# Patient Record
Sex: MALE | NOT HISPANIC OR LATINO | Employment: FULL TIME | URBAN - METROPOLITAN AREA
[De-identification: names, ages, dates, MRNs, and addresses within clinical notes are randomized per-mention and may not be internally consistent; named-entity substitution may affect disease eponyms.]

---

## 2017-07-11 ENCOUNTER — OFFICE VISIT (OUTPATIENT)
Dept: FAMILY MEDICINE | Facility: CLINIC | Age: 42
End: 2017-07-11
Payer: COMMERCIAL

## 2017-07-11 VITALS
SYSTOLIC BLOOD PRESSURE: 126 MMHG | OXYGEN SATURATION: 98 % | DIASTOLIC BLOOD PRESSURE: 80 MMHG | HEIGHT: 69 IN | TEMPERATURE: 98.1 F | BODY MASS INDEX: 28.88 KG/M2 | WEIGHT: 195 LBS | HEART RATE: 69 BPM

## 2017-07-11 DIAGNOSIS — T73.3XXA: ICD-10-CM

## 2017-07-11 DIAGNOSIS — E86.1 HYPOVOLEMIA: ICD-10-CM

## 2017-07-11 DIAGNOSIS — R74.8 ELEVATED CK: Primary | ICD-10-CM

## 2017-07-11 LAB
ALBUMIN UR-MCNC: NEGATIVE MG/DL
APPEARANCE UR: CLEAR
BILIRUB UR QL STRIP: NEGATIVE
COLOR UR AUTO: YELLOW
GLUCOSE UR STRIP-MCNC: NEGATIVE MG/DL
HGB UR QL STRIP: NEGATIVE
KETONES UR STRIP-MCNC: NEGATIVE MG/DL
LEUKOCYTE ESTERASE UR QL STRIP: NEGATIVE
NITRATE UR QL: NEGATIVE
PH UR STRIP: 6.5 PH (ref 5–7)
SP GR UR STRIP: 1.01 (ref 1–1.03)
URN SPEC COLLECT METH UR: NORMAL
UROBILINOGEN UR STRIP-ACNC: 0.2 EU/DL (ref 0.2–1)

## 2017-07-11 PROCEDURE — 81003 URINALYSIS AUTO W/O SCOPE: CPT | Performed by: PHYSICIAN ASSISTANT

## 2017-07-11 PROCEDURE — 80053 COMPREHEN METABOLIC PANEL: CPT | Performed by: PHYSICIAN ASSISTANT

## 2017-07-11 PROCEDURE — 99213 OFFICE O/P EST LOW 20 MIN: CPT | Performed by: PHYSICIAN ASSISTANT

## 2017-07-11 PROCEDURE — 82550 ASSAY OF CK (CPK): CPT | Performed by: PHYSICIAN ASSISTANT

## 2017-07-11 PROCEDURE — 36415 COLL VENOUS BLD VENIPUNCTURE: CPT | Performed by: PHYSICIAN ASSISTANT

## 2017-07-11 NOTE — PROGRESS NOTES
"  SUBJECTIVE:                                                    Kobe Bravo III is a 42 year old male who presents to clinic today for the following health issues:    ED/UC Followup:    [place]  [facility] [place] of visit: 17  Reason for visit: Hypovolemia/dehydration  Current Status: states he is feeling good, has returned to normal activities. Has had some fatigue and loss of appetite both are slowly getting better.     -Patient is a 43yo male for follow up after [place] [facility] [place]  -He was participating in a triathalon and towards the end of the race passed out  -he remembers starting to stumble but his teammates told him he was actually stretching and sitting (does not remember this well)  -He feels like he was hydrating and fueling well    -At the ED he had elevated creatinine and BUN, lowered GFR  -CK total: 3692    -Today is the best he felt  -Denies any headache  -Urination is \"ok\", \"a work in progress\", nothing with blood or darkening  -there is no flank pain  -returning to normal activities    Problem list and histories reviewed & adjusted, as indicated.  Additional history: as documented    Patient Active Problem List   Diagnosis     CARDIOVASCULAR SCREENING; LDL GOAL LESS THAN 160     Past Surgical History:   Procedure Laterality Date     ARTHROSCOPY KNEE WITH MEDIAL MENISCECTOMY  2012    Procedure:ARTHROSCOPY KNEE WITH MEDIAL MENISCECTOMY; Left Knee scope with Partial Medial Menisectomy  ; Surgeon:ONOFRE PATRICIA; Location: OR     ORTHOPEDIC SURGERY      marvin ACL repair     ORTHOPEDIC SURGERY      left foot fracture repair     SEPTOPLASTY       wisdom teeth[         Social History   Substance Use Topics     Smoking status: Never Smoker     Smokeless tobacco: Former User     Quit date: 2011     Alcohol use 0.0 oz/week     0 Standard drinks or equivalent per week      Comment: on occ     Family History   Problem Relation Age of Onset     DIABETES Father       complications of DM age 69 " "    C.A.D. Father      Asthma Mother      HEART DISEASE Mother      A-fib     Alcohol/Drug Paternal Grandmother            Reviewed and updated as needed this visit by clinical staff       Reviewed and updated as needed this visit by Provider         ROS:  Constitutional, HEENT, cardiovascular, pulmonary, gi and gu systems are negative, except as otherwise noted.    OBJECTIVE:     /80  Pulse 69  Temp 98.1  F (36.7  C) (Oral)  Ht 5' 9\" (1.753 m)  Wt 195 lb (88.5 kg)  SpO2 98%  BMI 28.8 kg/m2  Body mass index is 28.8 kg/(m^2).  GENERAL: healthy, alert and no distress  EYES: Eyes grossly normal to inspection, PERRL and conjunctivae and sclerae normal  RESP: lungs clear to auscultation - no rales, rhonchi or wheezes  CV: regular rates and rhythm, normal S1 S2, no S3 or S4 and no murmur, click or rub  ABDOMEN: soft, nontender, no hepatosplenomegaly, no masses and bowel sounds normal  MS: no gross musculoskeletal defects noted, no edema  NEURO: Normal strength and tone, mentation intact and speech normal  PSYCH: mentation appears normal, affect normal/bright    Diagnostic Test Results:  See A/P    ASSESSMENT/PLAN:   1. Elevated CK  2. Hypovolemia  3. Exhaustion due to overexertion, initial encounter  Patient feeling continued improvement since his ED visit. We will plan on trending the CK today along with renal studies and a UA. Hold any nsaids at this point and continue with hydration. Limit any weight resistance training, or any strenuous activity at this point until noting improvement of labs.     Sukumar Lucero PA-C  [facility]    "

## 2017-07-11 NOTE — NURSING NOTE
"Chief Complaint   Patient presents with     ER F/U       Initial /80  Pulse 69  Temp 98.1  F (36.7  C) (Oral)  Ht 5' 9\" (1.753 m)  Wt 195 lb (88.5 kg)  SpO2 98%  BMI 28.8 kg/m2 Estimated body mass index is 28.8 kg/(m^2) as calculated from the following:    Height as of this encounter: 5' 9\" (1.753 m).    Weight as of this encounter: 195 lb (88.5 kg).  Medication Reconciliation: complete   Carlton Reeder CMA        "

## 2017-07-11 NOTE — MR AVS SNAPSHOT
"              After Visit Summary   2017    Kobe Bravo III    MRN: [id number]           Patient Information     Date Of Birth          1975        Visit Information        Provider Department      2017 2:00 PM Sukumar Lucero PA-C [facility]        Today's Diagnoses     Elevated CK    -  1    Hypovolemia        Exhaustion due to overexertion, initial encounter           Follow-ups after your visit        Who to contact     If you have questions or need follow up information about today's clinic visit or your schedule please contact [facility] directly at [phone].  Normal or non-critical lab and imaging results will be communicated to you by [facility], letter or phone within 4 business days after the clinic has received the results. If you do not hear from us within 7 days, please contact the clinic through [facility] or phone. If you have a critical or abnormal lab result, we will notify you by phone as soon as possible.  Submit refill requests through [employer] or call your pharmacy and they will forward the refill request to us. Please allow 3 business days for your refill to be completed.          Additional Information About Your Visit        MyChart Information     [employer] lets you send messages to your doctor, view your test results, renew your prescriptions, schedule appointments and more. To sign up, go to [email or web address] . Click on \"Log in\" on the left side of the screen, which will take you to the Welcome page. Then click on \"Sign up Now\" on the right side of the page.     You will be asked to enter the access code listed below, as well as some personal information. Please follow the directions to create your username and password.     Your access code is: [email or web address]  Expires: 10/9/2017  2:38 PM     Your access code will  in 90 days. If you need help or a new code, please call your [facility] or [phone].        Care " "EveryWhere ID     This is your Care EveryWhere ID. This could be used by other organizations to access your [place] medical records  [id number]        Your Vitals Were     Pulse Temperature Height Pulse Oximetry BMI (Body Mass Index)       69 98.1  F (36.7  C) (Oral) 5' 9\" (1.753 m) 98% 28.8 kg/m2        Blood Pressure from Last 3 Encounters:   07/11/17 126/80   08/18/16 118/72   01/31/15 126/80    Weight from Last 3 Encounters:   07/11/17 195 lb (88.5 kg)   08/18/16 200 lb 9.6 oz (91 kg)   01/31/15 197 lb (89.4 kg)              We Performed the Following     *UA reflex to Microscopic and Culture ([facility] (except [place] and [place])     CK total     Comprehensive metabolic panel        Primary Care Provider Office Phone # Fax #    Gene Greco [phone]       XXX RESIGNED  [address] [facility] [address]  [facility] [address]        Equal Access to Services     [facility]: Hadii aad ku hadasho Soomaali, waaxda luqadaha, qaybta kaalmada adeegyada, waxgenaro lopez . So [facility] [phone].    ATENCIÓN: Si habla español, tiene a gracia disposición servicios gratuitos de asistencia lingüística. Angelaame al [phone].    We comply with applicable federal civil rights laws and [place] laws. We do not discriminate on the basis of race, color, national origin, age, disability sex, sexual orientation or gender identity.            Thank you!     Thank you for choosing [facility]  for your care. Our goal is always to provide you with excellent care. Hearing back from our patients is one way we can continue to improve our services. Please take a few minutes to complete the written survey that you may receive in the mail after your visit with us. Thank you!             Your Updated Medication List - Protect others around you: Learn how to safely use, store and throw away your medicines at www.disposemymeds.org.          This list is accurate " as of: 7/11/17  2:38 PM.  Always use your most recent med list.                   Brand Name Dispense Instructions for use Diagnosis    NO ACTIVE MEDICATIONS

## 2017-07-12 DIAGNOSIS — R74.8 ELEVATED CK: Primary | ICD-10-CM

## 2017-07-12 LAB
ALBUMIN SERPL-MCNC: 4.1 G/DL (ref 3.4–5)
ALP SERPL-CCNC: 55 U/L (ref 40–150)
ALT SERPL W P-5'-P-CCNC: 338 U/L (ref 0–70)
ANION GAP SERPL CALCULATED.3IONS-SCNC: 8 MMOL/L (ref 3–14)
AST SERPL W P-5'-P-CCNC: 152 U/L (ref 0–45)
BILIRUB SERPL-MCNC: 0.6 MG/DL (ref 0.2–1.3)
BUN SERPL-MCNC: 16 MG/DL (ref 7–30)
CALCIUM SERPL-MCNC: 9.5 MG/DL (ref 8.5–10.1)
CHLORIDE SERPL-SCNC: 106 MMOL/L (ref 94–109)
CK SERPL-CCNC: 1828 U/L (ref 30–300)
CO2 SERPL-SCNC: 27 MMOL/L (ref 20–32)
CREAT SERPL-MCNC: 1.18 MG/DL (ref 0.66–1.25)
GFR SERPL CREATININE-BSD FRML MDRD: 68 ML/MIN/1.7M2
GLUCOSE SERPL-MCNC: 89 MG/DL (ref 70–99)
POTASSIUM SERPL-SCNC: 4 MMOL/L (ref 3.4–5.3)
PROT SERPL-MCNC: 7.1 G/DL (ref 6.8–8.8)
SODIUM SERPL-SCNC: 141 MMOL/L (ref 133–144)

## 2017-07-20 DIAGNOSIS — R74.8 ELEVATED CK: ICD-10-CM

## 2017-07-20 LAB
ALBUMIN SERPL-MCNC: 4.3 G/DL (ref 3.4–5)
ALP SERPL-CCNC: 59 U/L (ref 40–150)
ALT SERPL W P-5'-P-CCNC: 63 U/L (ref 0–70)
ANION GAP SERPL CALCULATED.3IONS-SCNC: 3 MMOL/L (ref 3–14)
AST SERPL W P-5'-P-CCNC: 20 U/L (ref 0–45)
BILIRUB SERPL-MCNC: 0.6 MG/DL (ref 0.2–1.3)
BUN SERPL-MCNC: 23 MG/DL (ref 7–30)
CALCIUM SERPL-MCNC: 10.2 MG/DL (ref 8.5–10.1)
CHLORIDE SERPL-SCNC: 105 MMOL/L (ref 94–109)
CK SERPL-CCNC: 81 U/L (ref 30–300)
CO2 SERPL-SCNC: 31 MMOL/L (ref 20–32)
CREAT SERPL-MCNC: 1.02 MG/DL (ref 0.66–1.25)
GFR SERPL CREATININE-BSD FRML MDRD: 81 ML/MIN/1.7M2
GLUCOSE SERPL-MCNC: 86 MG/DL (ref 70–99)
POTASSIUM SERPL-SCNC: 4.4 MMOL/L (ref 3.4–5.3)
PROT SERPL-MCNC: 7.5 G/DL (ref 6.8–8.8)
SODIUM SERPL-SCNC: 139 MMOL/L (ref 133–144)

## 2017-07-20 PROCEDURE — 82550 ASSAY OF CK (CPK): CPT | Performed by: PHYSICIAN ASSISTANT

## 2017-07-20 PROCEDURE — 36415 COLL VENOUS BLD VENIPUNCTURE: CPT | Performed by: PHYSICIAN ASSISTANT

## 2017-07-20 PROCEDURE — 80053 COMPREHEN METABOLIC PANEL: CPT | Performed by: PHYSICIAN ASSISTANT

## 2018-03-22 ENCOUNTER — OFFICE VISIT (OUTPATIENT)
Dept: FAMILY MEDICINE | Facility: CLINIC | Age: 43
End: 2018-03-22
Payer: COMMERCIAL

## 2018-03-22 VITALS
OXYGEN SATURATION: 98 % | WEIGHT: 211.7 LBS | RESPIRATION RATE: 19 BRPM | SYSTOLIC BLOOD PRESSURE: 133 MMHG | BODY MASS INDEX: 31.36 KG/M2 | TEMPERATURE: 98.4 F | HEIGHT: 69 IN | DIASTOLIC BLOOD PRESSURE: 72 MMHG | HEART RATE: 80 BPM

## 2018-03-22 DIAGNOSIS — Z13.6 CARDIOVASCULAR SCREENING; LDL GOAL LESS THAN 160: ICD-10-CM

## 2018-03-22 DIAGNOSIS — Z00.00 ENCOUNTER FOR ROUTINE ADULT HEALTH EXAMINATION WITHOUT ABNORMAL FINDINGS: Primary | ICD-10-CM

## 2018-03-22 PROCEDURE — 99396 PREV VISIT EST AGE 40-64: CPT | Performed by: PHYSICIAN ASSISTANT

## 2018-03-22 ASSESSMENT — ENCOUNTER SYMPTOMS
HEMATOCHEZIA: 0
DIZZINESS: 0
CHILLS: 0
HEMATURIA: 0
COUGH: 0
CONSTIPATION: 0
ABDOMINAL PAIN: 0
DIARRHEA: 0
EYE PAIN: 0

## 2018-03-22 NOTE — MR AVS SNAPSHOT
After Visit Summary   3/22/2018    Kobe Bravo III    MRN: [id number]           Patient Information     Date Of Birth          1975        Visit Information        Provider Department      3/22/2018 8:00 AM Sukumar Lucero PA-C [facility]        Today's Diagnoses     Encounter for routine adult health examination without abnormal findings    -  1    CARDIOVASCULAR SCREENING; LDL GOAL LESS THAN 160          Care Instructions      Preventive Health Recommendations  Male Ages 40 to 49    Yearly exam:             See your health care provider every year in order to  o   Review health changes.   o   Discuss preventive care.    o   Review your medicines if your doctor has prescribed any.    You should be tested each year for STDs (sexually transmitted diseases) if you re at risk.     Have a cholesterol test every 5 years.     Have a colonoscopy (test for colon cancer) if someone in your family has had colon cancer or polyps before age 50.     After age 45, have a diabetes test (fasting glucose). If you are at risk for diabetes, you should have this test every 3 years.      Talk with your health care provider about whether or not a prostate cancer screening test (PSA) is right for you.    Shots: Get a flu shot each year. Get a tetanus shot every 10 years.     Nutrition:    Eat at least 5 servings of fruits and vegetables daily.     Eat whole-grain bread, whole-wheat pasta and brown rice instead of white grains and rice.     Talk to your provider about Calcium and Vitamin D.     Lifestyle    Exercise for at least 150 minutes a week (30 minutes a day, 5 days a week). This will help you control your weight and prevent disease.     Limit alcohol to one drink per day.     No smoking.     Wear sunscreen to prevent skin cancer.     See your dentist every six months for an exam and cleaning.              Follow-ups after your visit        Future tests that were ordered for you today      "Open Future Orders        Priority Expected Expires Ordered    Basic metabolic panel  (Ca, Cl, CO2, Creat, Gluc, K, Na, BUN) Routine  3/22/2019 3/22/2018    Lipid panel reflex to direct LDL Fasting Routine  3/22/2019 3/22/2018            Who to contact     If you have questions or need follow up information about today's clinic visit or your schedule please contact [facility] directly at [phone].  Normal or non-critical lab and imaging results will be communicated to you by [employer], letter or phone within 4 business days after the clinic has received the results. If you do not hear from us within 7 days, please contact the clinic through [employer] or phone. If you have a critical or abnormal lab result, we will notify you by phone as soon as possible.  Submit refill requests through [employer] or call your pharmacy and they will forward the refill request to us. Please allow 3 business days for your refill to be completed.          Additional Information About Your Visit        [employer] Information     [employer] gives you secure access to your electronic health record. If you see a primary care provider, you can also send messages to your care team and make appointments. If you have questions, please call your primary care clinic.  If you do not have a primary care provider, please call [phone] and they will assist you.        Care EveryWhere ID     This is your Care EveryWhere ID. This could be used by other organizations to access your [place] medical records  [id number]        Your Vitals Were     Pulse Temperature Respirations Height Pulse Oximetry BMI (Body Mass Index)    80 98.4  F (36.9  C) (Tympanic) 19 5' 8.5\" (1.74 m) 98% 31.72 kg/m2       Blood Pressure from Last 3 Encounters:   03/22/18 133/72   07/11/17 126/80   08/18/16 118/72    Weight from Last 3 Encounters:   03/22/18 211 lb 11.2 oz (96 kg)   07/11/17 195 lb (88.5 kg)   08/18/16 200 lb 9.6 oz (91 kg)               Primary Care " Provider Office Phone # Fax #    [facility] [phone]       [address]  [facility] [address]        Equal Access to Services     [facility] : Hadii aad ku hadlaineyo Soomaali, waaxda luqadaha, qaybta kaalmada adeegyada, paxton simsn darling hernandez lamaria gjaneth castañeda. So [facility] [phone].    ATENCIÓN: Si habla español, tiene a gracia disposición servicios gratuitos de asistencia lingüística. Llame al [phone].    We comply with applicable federal civil rights laws and [place] laws. We do not discriminate on the basis of race, color, national origin, age, disability, sex, sexual orientation, or gender identity.            Thank you!     Thank you for choosing [facility]  for your care. Our goal is always to provide you with excellent care. Hearing back from our patients is one way we can continue to improve our services. Please take a few minutes to complete the written survey that you may receive in the mail after your visit with us. Thank you!             Your Updated Medication List - Protect others around you: Learn how to safely use, store and throw away your medicines at www.disposemymeds.org.          This list is accurate as of 3/22/18  8:33 AM.  Always use your most recent med list.                   Brand Name Dispense Instructions for use Diagnosis    NO ACTIVE MEDICATIONS

## 2018-03-22 NOTE — PROGRESS NOTES
SUBJECTIVE:   CC: Kobe Bravo III is an 42 year old male who presents for preventative health visit.     Physical   Annual:     Getting at least 3 servings of Calcium per day::  Yes    Bi-annual eye exam::  NO    Dental care twice a year::  Yes    Sleep apnea or symptoms of sleep apnea::  None    Diet::  Regular (no restrictions)    Frequency of exercise::  4-5 days/week    Duration of exercise::  45-60 minutes    Taking medications regularly::  Yes    Medication side effects::  Not applicable    Additional concerns today::  No          Patient presents for annual physical  He remains active regularly but has gained weight  He does not smoke, uses social etoh      Today's PHQ-2 Score:   PHQ-2 ( 1999 [employer]) 3/22/2018   Q1: Little interest or pleasure in doing things 0   Q2: Feeling down, depressed or hopeless 0   PHQ-2 Score 0   Q1: Little interest or pleasure in doing things Not at all   Q2: Feeling down, depressed or hopeless Not at all   PHQ-2 Score 0       Abuse: Current or Past(Physical, Sexual or Emotional)- No  Do you feel safe in your environment - Yes    Social History   Substance Use Topics     Smoking status: Never Smoker     Smokeless tobacco: Former User     Quit date: 6/11/2011     Alcohol use 0.0 oz/week     0 Standard drinks or equivalent per week      Comment: on occ     Alcohol Use 3/22/2018   If you drink alcohol do you typically have greater than 3 drinks per day OR greater than 7 drinks per week? No       Last PSA: No results found for: PSA    Reviewed orders with patient. Reviewed health maintenance and updated orders accordingly - Yes  Labs reviewed in EPIC    Reviewed and updated as needed this visit by clinical staff         Reviewed and updated as needed this visit by Provider            Review of Systems   Constitutional: Negative for chills.   HENT: Negative for congestion and ear pain.    Eyes: Negative for pain.   Respiratory: Negative for cough.    Cardiovascular: Negative for chest  "pain.   Gastrointestinal: Negative for abdominal pain, constipation, diarrhea and hematochezia.   Genitourinary: Negative for hematuria.   Neurological: Negative for dizziness.       OBJECTIVE:   /72 (BP Location: Right arm, Patient Position: Chair, Cuff Size: Adult Large)  Pulse 80  Temp 98.4  F (36.9  C) (Tympanic)  Resp 19  Ht 5' 8.5\" (1.74 m)  Wt 211 lb 11.2 oz (96 kg)  SpO2 98%  BMI 31.72 kg/m2    Physical Exam  GENERAL: healthy, alert and no distress  EYES: Eyes grossly normal to inspection, PERRL and conjunctivae and sclerae normal  HENT: ear canals and TM's normal, nose and mouth without ulcers or lesions  NECK: no adenopathy, no asymmetry, masses, or scars and thyroid normal to palpation  RESP: lungs clear to auscultation - no rales, rhonchi or wheezes  CV: regular rate and rhythm, normal S1 S2, no S3 or S4, no murmur, click or rub, no peripheral edema and peripheral pulses strong  ABDOMEN: soft, nontender, no hepatosplenomegaly, no masses and bowel sounds normal   (male): normal male genitalia without lesions or urethral discharge, no hernia  MS: no gross musculoskeletal defects noted, no edema  NEURO: Normal strength and tone, mentation intact and speech normal  PSYCH: mentation appears normal, affect normal/bright    ASSESSMENT/PLAN:   1. Encounter for routine adult health examination without abnormal findings  - Basic metabolic panel  (Ca, Cl, CO2, Creat, Gluc, K, Na, BUN); Future  - Lipid panel reflex to direct LDL Fasting; Future    2. CARDIOVASCULAR SCREENING; LDL GOAL LESS THAN 160  - Lipid panel reflex to direct LDL Fasting; Future    COUNSELING:   Reviewed preventive health counseling, as reflected in patient instructions       Regular exercise       Healthy diet/nutrition    BP Screening:   Last 3 BP Readings:    BP Readings from Last 3 Encounters:   03/22/18 133/72   07/11/17 126/80   08/18/16 118/72       The following was recommended to the patient:  Re-screen BP within a year " "and recommended lifestyle modifications     reports that he has never smoked. He quit smokeless tobacco use about 6 years ago.    Estimated body mass index is 28.8 kg/(m^2) as calculated from the following:    Height as of 7/11/17: 5' 9\" (1.753 m).    Weight as of 7/11/17: 195 lb (88.5 kg).   Weight management plan: Discussed healthy diet and exercise guidelines and patient will follow up in 12 months in clinic to re-evaluate.    Counseling Resources:  ATP IV Guidelines  Pooled Cohorts Equation Calculator  FRAX Risk Assessment  ICSI Preventive Guidelines  Dietary Guidelines for Americans, 2010  USDA's MyPlate  ASA Prophylaxis  Lung CA Screening    Sukumar Lucero PA-C  [facility]  Answers for HPI/ROS submitted by the patient on 3/22/2018   PHQ-2 Score: 0    "

## 2018-03-23 DIAGNOSIS — Z00.00 ENCOUNTER FOR ROUTINE ADULT HEALTH EXAMINATION WITHOUT ABNORMAL FINDINGS: ICD-10-CM

## 2018-03-23 DIAGNOSIS — Z13.6 CARDIOVASCULAR SCREENING; LDL GOAL LESS THAN 160: ICD-10-CM

## 2018-03-23 LAB
ANION GAP SERPL CALCULATED.3IONS-SCNC: 7 MMOL/L (ref 3–14)
BUN SERPL-MCNC: 17 MG/DL (ref 7–30)
CALCIUM SERPL-MCNC: 9.1 MG/DL (ref 8.5–10.1)
CHLORIDE SERPL-SCNC: 106 MMOL/L (ref 94–109)
CHOLEST SERPL-MCNC: 182 MG/DL
CO2 SERPL-SCNC: 27 MMOL/L (ref 20–32)
CREAT SERPL-MCNC: 0.97 MG/DL (ref 0.66–1.25)
GFR SERPL CREATININE-BSD FRML MDRD: 86 ML/MIN/1.7M2
GLUCOSE SERPL-MCNC: 87 MG/DL (ref 70–99)
HDLC SERPL-MCNC: 63 MG/DL
LDLC SERPL CALC-MCNC: 110 MG/DL
NONHDLC SERPL-MCNC: 119 MG/DL
POTASSIUM SERPL-SCNC: 4.3 MMOL/L (ref 3.4–5.3)
SODIUM SERPL-SCNC: 140 MMOL/L (ref 133–144)
TRIGL SERPL-MCNC: 46 MG/DL

## 2018-03-23 PROCEDURE — 80061 LIPID PANEL: CPT | Performed by: PHYSICIAN ASSISTANT

## 2018-03-23 PROCEDURE — 36415 COLL VENOUS BLD VENIPUNCTURE: CPT | Performed by: PHYSICIAN ASSISTANT

## 2018-03-23 PROCEDURE — 80048 BASIC METABOLIC PNL TOTAL CA: CPT | Performed by: PHYSICIAN ASSISTANT

## 2018-11-30 ENCOUNTER — OFFICE VISIT (OUTPATIENT)
Dept: FAMILY MEDICINE | Facility: CLINIC | Age: 43
End: 2018-11-30
Payer: COMMERCIAL

## 2018-11-30 VITALS
SYSTOLIC BLOOD PRESSURE: 131 MMHG | WEIGHT: 223.1 LBS | OXYGEN SATURATION: 97 % | HEIGHT: 69 IN | RESPIRATION RATE: 12 BRPM | DIASTOLIC BLOOD PRESSURE: 82 MMHG | BODY MASS INDEX: 33.04 KG/M2 | HEART RATE: 74 BPM | TEMPERATURE: 98.8 F

## 2018-11-30 DIAGNOSIS — R06.83 SNORING: Primary | ICD-10-CM

## 2018-11-30 DIAGNOSIS — Z23 NEED FOR PROPHYLACTIC VACCINATION AND INOCULATION AGAINST INFLUENZA: ICD-10-CM

## 2018-11-30 PROCEDURE — 99213 OFFICE O/P EST LOW 20 MIN: CPT | Mod: 25 | Performed by: PHYSICIAN ASSISTANT

## 2018-11-30 PROCEDURE — 90686 IIV4 VACC NO PRSV 0.5 ML IM: CPT | Performed by: PHYSICIAN ASSISTANT

## 2018-11-30 PROCEDURE — 90471 IMMUNIZATION ADMIN: CPT | Performed by: PHYSICIAN ASSISTANT

## 2018-11-30 NOTE — PROGRESS NOTES
SUBJECTIVE:   Kobe Bravo III is a 43 year old male who presents to clinic today for the following health issues:    Insomnia    Duration: Last couple months     Description  Frequency of insomnia:  1-2 times a week  Time to fall asleep: 15 minutes  Middle of night awakenin-2 times a week  Early morning awakenin-2 times a week    Accompanying signs and symptoms:  snoring    History  Similar episodes in past:  no   Previous evaluation/sleep study:  no     Precipitating or alleviating factors:  New stressful situation: no   Caffeine intake after lunchtime: no   OTC decongestants: no   Any new medications: no     Therapies tried and outcome: none    -Patient presents to discuss recent changes to sleep  -He notes in an ideal situation he'll go to bed at 9-930, get up around 4-5  -there are usually no problems sleeping  -normally he is able to stay asleep during the night   -periodically he will wake up, but cannot fall back asleep  -he does feel rested when waking up unless it is one of the days where he struggled the night before  -no extreme daytime drowsiness  -he does snore heavily per spouse; there are observed times where it looks like he stops breathing  -some snort arousal  -he does not wake up with shortness of breath, gasping  -it does seem to be getting worse  -impacting spouse    Problem list and histories reviewed & adjusted, as indicated.  Additional history: as documented    Patient Active Problem List   Diagnosis     CARDIOVASCULAR SCREENING; LDL GOAL LESS THAN 160     Past Surgical History:   Procedure Laterality Date     ARTHROSCOPY KNEE WITH MEDIAL MENISCECTOMY  2012    Procedure:ARTHROSCOPY KNEE WITH MEDIAL MENISCECTOMY; Left Knee scope with Partial Medial Menisectomy  ; Surgeon:ONOFRE PATRICIA; Location: OR     ORTHOPEDIC SURGERY      marvin ACL repair     ORTHOPEDIC SURGERY      left foot fracture repair     SEPTOPLASTY       wisdom teeth[         Social History   Substance Use  "Topics     Smoking status: Never Smoker     Smokeless tobacco: Former User     Quit date: 2011     Alcohol use 0.0 oz/week     0 Standard drinks or equivalent per week      Comment: on occ     Family History   Problem Relation Age of Onset     Diabetes Father       complications of DM age 69     C.A.D. Father      Asthma Mother      Heart Disease Mother      A-fib     Alcohol/Drug Paternal Grandmother            Reviewed and updated as needed this visit by clinical staff       Reviewed and updated as needed this visit by Provider         ROS:  Constitutional, HEENT, cardiovascular, pulmonary, gi and gu systems are negative, except as otherwise noted.    OBJECTIVE:     /82  Pulse 74  Temp 98.8  F (37.1  C) (Tympanic)  Resp 12  Ht 5' 8.5\" (1.74 m)  Wt 223 lb 1.6 oz (101.2 kg)  SpO2 97%  BMI 33.43 kg/m2  Body mass index is 33.43 kg/(m^2).  GENERAL: healthy, alert and no distress  EYES: Eyes grossly normal to inspection, PERRL and conjunctivae and sclerae normal  HENT: normal cephalic/atraumatic, ear canals and TM's normal, nasal mucosa edematous , oropharynx clear, oral mucous membranes moist and tonsil wnl 1+  RESP: lungs clear to auscultation - no rales, rhonchi or wheezes  CV: regular rates and rhythm, normal S1 S2, no S3 or S4 and no murmur, click or rub  ABDOMEN: obese, soft    Diagnostic Test Results:  none     ASSESSMENT/PLAN:   1. Snoring  Stop bang mid to high. There are witnessed apneas and snort arousal. Has added some weight. Referral to sleep placed.  - SLEEP EVALUATION & MANAGEMENT REFERRAL - ADULT -[facility] [place]  [phone] (Age 18 and up); Future    2. Need for prophylactic vaccination and inoculation against influenza  - FLU VACCINE, SPLIT VIRUS, IM (QUADRIVALENT) [96129]- >3 YRS  - Vaccine Administration, Initial [65202]      Sukumar Lucero PA-C  [facility]    Injectable Influenza Immunization Documentation    1.  Is the person " to be vaccinated sick today?   No    2. Does the person to be vaccinated have an allergy to a component   of the vaccine?   No  Egg Allergy Algorithm Link    3. Has the person to be vaccinated ever had a serious reaction   to influenza vaccine in the past?   No    4. Has the person to be vaccinated ever had Guillain-Barré syndrome?   No    Form completed by Elaine Vivas MA

## 2018-11-30 NOTE — MR AVS SNAPSHOT
After Visit Summary   11/30/2018    Kobe Bravo III    MRN: [id number]           Patient Information     Date Of Birth          1975        Visit Information        Provider Department      11/30/2018 9:00 AM Sukumar Lucero PA-C [facility]        Today's Diagnoses     Snoring    -  1    Need for prophylactic vaccination and inoculation against influenza           Follow-ups after your visit        Additional Services     SLEEP EVALUATION & MANAGEMENT REFERRAL - [facility]  [phone] (Age 18 and up)       Please be aware that coverage of these services is subject to the terms and limitations of your health insurance plan.  Call member services at your health plan with any benefit or coverage questions.      Please bring the following to your appointment:    >>   List of current medications   >>   This referral request   >>   Any documents/labs given to you for this referral                      Follow-up notes from your care team     Return in about 3 months (around 2/28/2019).      Future tests that were ordered for you today     Open Future Orders        Priority Expected Expires Ordered    SLEEP EVALUATION & MANAGEMENT REFERRAL - [facility]  [phone] (Age 18 and up) Routine  11/30/2019 11/30/2018            Who to contact     If you have questions or need follow up information about today's clinic visit or your schedule please contact [facility] directly at [phone].  Normal or non-critical lab and imaging results will be communicated to you by MyChart, letter or phone within 4 business days after the clinic has received the results. If you do not hear from us within 7 days, please contact the clinic through MyChart or phone. If you have a critical or abnormal lab result, we will notify you by phone as soon as possible.  Submit refill requests through [employer] or call your  "pharmacy and they will forward the refill request to us. Please allow 3 business days for your refill to be completed.          Additional Information About Your Visit        MyChart Information     [employer] gives you secure access to your electronic health record. If you see a primary care provider, you can also send messages to your care team and make appointments. If you have questions, please call your primary care clinic.  If you do not have a primary care provider, please call [phone] and they will assist you.        Care EveryWhere ID     This is your Care EveryWhere ID. This could be used by other organizations to access your [place] medical records  [id number]        Your Vitals Were     Pulse Temperature Respirations Height Pulse Oximetry BMI (Body Mass Index)    74 98.8  F (37.1  C) (Tympanic) 12 5' 8.5\" (1.74 m) 97% 33.43 kg/m2       Blood Pressure from Last 3 Encounters:   11/30/18 131/82   03/22/18 133/72   07/11/17 126/80    Weight from Last 3 Encounters:   11/30/18 223 lb 1.6 oz (101.2 kg)   03/22/18 211 lb 11.2 oz (96 kg)   07/11/17 195 lb (88.5 kg)              We Performed the Following     FLU VACCINE, SPLIT VIRUS, IM (QUADRIVALENT) [70712]- >3 YRS     Vaccine Administration, Initial [38791]        Primary Care Provider Office Phone # Fax #    [facility] [phone] [address] [facility] [address]        Equal Access to Services     [facility] : Hadii aad ku hadasho Soomaali, waaxda luqadaha, qaybta kaalmada adeegyada, waxay ly castañeda. So [facility] [phone].    ATENCIÓN: Si habla español, tiene a gracia disposición servicios gratuitos de asistencia lingüística. Llame al [phone].    We comply with applicable federal civil rights laws and [place] laws. We do not discriminate on the basis of race, color, national origin, age, disability, sex, sexual orientation, or gender identity.            Thank you!     Thank you for " choosing [facility]  for your care. Our goal is always to provide you with excellent care. Hearing back from our patients is one way we can continue to improve our services. Please take a few minutes to complete the written survey that you may receive in the mail after your visit with us. Thank you!             Your Updated Medication List - Protect others around you: Learn how to safely use, store and throw away your medicines at www.disposemymeds.org.          This list is accurate as of 11/30/18  9:37 AM.  Always use your most recent med list.                   Brand Name Dispense Instructions for use Diagnosis    NO ACTIVE MEDICATIONS

## 2019-01-21 ENCOUNTER — OFFICE VISIT (OUTPATIENT)
Dept: SLEEP MEDICINE | Facility: CLINIC | Age: 44
End: 2019-01-21
Attending: PHYSICIAN ASSISTANT
Payer: COMMERCIAL

## 2019-01-21 VITALS
SYSTOLIC BLOOD PRESSURE: 139 MMHG | WEIGHT: 224 LBS | RESPIRATION RATE: 15 BRPM | BODY MASS INDEX: 33.18 KG/M2 | DIASTOLIC BLOOD PRESSURE: 67 MMHG | OXYGEN SATURATION: 98 % | HEART RATE: 80 BPM | HEIGHT: 69 IN

## 2019-01-21 DIAGNOSIS — G47.9 SLEEP DISTURBANCE: Primary | ICD-10-CM

## 2019-01-21 DIAGNOSIS — E66.9 OBESITY (BMI 30-39.9): ICD-10-CM

## 2019-01-21 DIAGNOSIS — R06.83 SNORING: ICD-10-CM

## 2019-01-21 PROCEDURE — 99204 OFFICE O/P NEW MOD 45 MIN: CPT | Performed by: INTERNAL MEDICINE

## 2019-01-21 RX ORDER — ZOLPIDEM TARTRATE 5 MG/1
TABLET ORAL
Qty: 1 TABLET | Refills: 0 | Status: SHIPPED | OUTPATIENT
Start: 2019-01-21 | End: 2019-12-27

## 2019-01-21 ASSESSMENT — MIFFLIN-ST. JEOR: SCORE: 1903.5

## 2019-01-21 NOTE — NURSING NOTE
"Chief Complaint   Patient presents with     Consult     Snores per wife, gasps occasionally per wife,  No SS or cpap       Initial /67   Pulse 80   Resp 15   Ht 1.74 m (5' 8.5\")   Wt 101.6 kg (224 lb)   SpO2 98%   BMI 33.56 kg/m   Estimated body mass index is 33.56 kg/m  as calculated from the following:    Height as of this encounter: 1.74 m (5' 8.5\").    Weight as of this encounter: 101.6 kg (224 lb).    Medication Reconciliation: complete    Neck circumference: 18 inches / 46 centimeters.  Ess 4    Elizabeth Berumen LPN  "

## 2019-01-21 NOTE — PROGRESS NOTES
[facility]  Outpatient Sleep Medicine Consultation  January 21, 2019      Name: Kobe Bravo III MRN# [id number]   Age: 43 year old YOB: 1975     Date of Consultation: January 21, 2019  Consultation is requested by: Sukumar Lucero PA-C  [address] [place] [address]  Primary care provider: [facility] clinic: [facility]       Reason for Sleep Consult:     Kobe Bravo III is a 43 year old male nightly witnessed apnea, snoring, gasping, poor quality of sleep and excessive daytime sleepiness and tiredness.         Assessment and Plan:     Summary Sleep Diagnoses/Recommendations:    1. Sleep Disturbance:  High suspicion of sleep disordered breathing based on patient's symptoms (snoring, excessive daytime sleepiness, witnessed apneas), high BMI, neck circumference and oropharyngeal examination. Will schedule Home sleep study. We also discussed the pathophysiology of sleep disordered breathing and the importance of treating it if S/he should have it. Patient is advised not to drive if he/she feels drowsy or sleepy. Hand out was given to the patient.  Follow up after sleep study to discuss the result of sleep study and treatment options.    2. Obesity:  Counseled regarding weight loss through diet modification and increased physical activity. Patient was given instuctions of weight loss and advised to follow up her PCP for further weight loss interventions.    3. Non restorative sleep: recommend sleep study, stimulus control, avoidance of alcohol at night and good sleep hygiene    4.  Poor sleep hygiene:   - We instructed patient to develop regular sleep-wake schedule and regular sleep habits including regular bedtime and wake time to strengthen circadian rhythm, to sleep as much as needed to feel refreshed, not to spend more time in bed than needed. Bedroom should be dark, cool and quiet.  - We also asked patient to avoid napping  during the day, forcing you to sleep, taking problems to bed, strenuous activity just before bedtime, use of caffeine, alcohol or tobacco just before bedtime, reading, eating or watching TV in bed.  Good sleep hygiene and sleep-wake instructions were given.        Orders Placed This Encounter   Procedures     HST-Home Sleep Apnea Test    Ambien 5 mg x1    Summary Counseling:  See instructions    Counseling included a comprehensive review of diagnostic and therapeutic strategies as well as risks of inadequate therapy.  Educational materials provided in instructions.    All questions were answered.  The patient indicates understanding of the above issues and agrees with the plan set forth.           History of Present Illness:     Kobe Bravo III is a 43 year old male with no significant past medical history who presents to the [facility] in [place] with complains of sleep disturbance. I was asked to see Mr. Bravo regarding snoring by Sukumar Lucero PA-C.   Patient complaints loud snoring, witnessed apnea, waking up gasping air, frequent awakening at night with some early awakening, restless sleep, non restorative sleep, dry mouth and throat for 6 months. He denies difficulty falling sleep but has difficulty staying asleep and wakes up 1-3 times and takes 1-2 hour to fall back to sleep. He denies morning headaches, acid reflux and restless legs but he kicks legs at night. He has gained 20 pounds in the last year.     Please see below for sleep ROS details.    PREVIOUS IN- LAB or HOME SLEEP STUDIES:  None     SLEEP-WAKE SCHEDULE:     Kobe Bravo III     -Describes themself as a morning person;      -ON WEEKDAYS, goes to sleep at 9:30 PM during the week; awakens  5:00 AM with an alarm; falls asleep in 15 minutes; denies difficulty falling asleep.     -ON WEEKENDS, goes to sleep at 10:00 PM and wakes up at 7:00 AM without an alarm; falls asleep in 15 minutes.       -Awakens 1-3 times a night  for  minutes before falling back to sleep; awakens to uncertain reasons and worries.      -Total sleep time: 6 hours per night.    -Naps 0 times/days per week      BEDTIME ACTIVITIES AND SHIFT WORK:    Kobe Bravo III    -does use electronics in bed and read in bed and does not watch TV in bed.     -does not do shift work.  He/she works day shifts.      Occupation: natural resource     [employer]       SLEEP APNEA: Stopbang score: 5       INSOMNIA:  Insomnia severity score:11       SLEEPINESS: [place] sleepiness scale (ESS): 4   Drowsy driving/near accidents: No          PHQ9: N/A    SLEEP COMPLAINTS:   Snoring- 3 days/week  Witness apnea: Yes  Gasping/Choking: Yes  Excessive daytime sleep: Yes  Toss/turn: Yes/No  Excessive tiredness/fatigue:  Yes  Morning headaches: No  Dry mouth/throat: Yes  Dyspnea: No  Coexisting Lung disease: No    Coexisting Heart disease: No    Does patient have a bed partner: Yes  Has bed partner been sleeping separately because of snoring:  No            RLS Screen: When you try to relax in the evening or sleep at  night, do you ever have unpleasant, restless feelings in your  legs that can be relieved by walking or movement? No    Periodic limb movement: Yes    Narcolepsy:       denies sudden urges of sleep attacks     denies cataplexy     denies sleep paralysis      denies hallucinations     Sleep Behaviors:     denies leg symptoms/movements     denies motor restlessness     denies night terrors     denies bruxism     denies automatic behaviors    Other subjective complaints:     denies anxiety or rumination      denies pain and discomfort at  night     denies waking up with heart pounding or racing     denies GERD/heartburn         Parasomnia:   NREM - denies recurrent persistent confusional arousal, night eating, sleep walking or sleep terrors   REM  - denies dream enactment; no injuries.     Safety: None             Medications:     Current Outpatient Medications   Medication Sig      NO ACTIVE MEDICATIONS      No current facility-administered medications for this visit.         Medication that can affect sleep: none     No Known Allergies         Past Medical History:     Does not need 02 supplement at night     No past medical history on file.            Past Surgical History:    Yes previous upper airway surgery: septoplasty for broken nose    Past Surgical History:   Procedure Laterality Date     ARTHROSCOPY KNEE WITH MEDIAL MENISCECTOMY  2012    Procedure:ARTHROSCOPY KNEE WITH MEDIAL MENISCECTOMY; Left Knee scope with Partial Medial Menisectomy  ; Surgeon:ONOFRE PATRICIA; Location:[facility] OR     ORTHOPEDIC SURGERY      marvin ACL repair     ORTHOPEDIC SURGERY      left foot fracture repair     SEPTOPLASTY       wisdom teeth[              Social History:     Social History     Tobacco Use     Smoking status: Never Smoker     Smokeless tobacco: Former User   Substance Use Topics     Alcohol use: Yes     Alcohol/week: 0.0 oz     Comment: on occ         Chemical History:     Tobacco: No     Uses 2-3 cups/day of coffee. Last caffeine intake is usually before 10:30 am    EtOH: Yes, 3 x a week 3-4 beers  Recreational Drugs: No    Psych Hx:   None    Current dangers to self or others: None           Family History:     Family History   Problem Relation Age of Onset     Diabetes Father          complications of DM age 69     C.A.D. Father      Asthma Mother      Heart Disease Mother         A-fib     Alcohol/Drug Paternal Grandmother         Sleep Family Hx:        RLS- No  ANGELINE - mother and father  Insomnia - mother  Parasomnia - No         Review of Systems:     A complete 10 point review of systems was negative other than HPI or as commented below:   Patient denies chest pain, dyspnea with activity and or rest, wheezing, abdominal pain, n&v, fever, chills, dysuria, leg pain or swelling. Patient is also denies ear pain, sore throat, postnasal drip, running nose, dry cough.      Kobe CASSIDY  "Lawrence ESCOBAR has gained 20 pounds in the last year.            Physical Examination:   /67   Pulse 80   Resp 15   Ht 1.74 m (5' 8.5\")   Wt 101.6 kg (224 lb)   SpO2 98%   BMI 33.56 kg/m       Neck Circumference: 46 cm   Constitutional: . Awake, alert, cooperative, in no apparent distress  Mood: euthymic; affect congruent with full range and intensity.  Attention/Concentration:  Normal   Eyes: Pupils round and reactive. No icterus.  ENT: Mallampati Class: IV.   Tonsillar Stage: 1  hidden by pillars  Clear nasal passages. Enlarged inferior turbinates. No deviated septum.  Oropharynx: No high arched palate. No pharyngeal erythema or exudates, micrognathia, small and crowded oropharynx,  low-lying soft palate, elongated uvula. lateral narrowing  Tongue: relative macroglossia   Dentition: Good.  Dentures: None  Neck: Supple, no thyroid enlargement.   Cardiovascular: Regular S1 and S2, no murmurs or gallops.    Pulmonary:  Chest symmetric, lungs reveal decreased breath sounds at bases. No rales or wheezes.  Abdomen: Soft, obese, non tender.  Extremities:  No pedal edema.  Muscle/joint: Strength and tone normal   Skin:  No rash or significant lesions examined areas of skin.   Neurologic: Alert, oriented x3, no focal neurological deficit.           Data: All pertinent previous laboratory data reviewed     No results found for: PH, PHARTERIAL, PO2, [id number], SAT, PCO2, HCO3, BASEEXCESS, JOSÉ, BEB  Lab Results   Component Value Date    TSH 1.50@ 05/29/2008     Lab Results   Component Value Date    GLC 87 03/23/2018    GLC 86 07/20/2017     Lab Results   Component Value Date    HGB 15.6 10/29/2014    HGB 15.9 01/17/2012     Lab Results   Component Value Date    BUN 17 03/23/2018    BUN 23 07/20/2017    CR 0.97 03/23/2018    CR 1.02 07/20/2017     Lab Results   Component Value Date    CO2 27 03/23/2018    CO2 31 07/20/2017     No results found for: MARGIE      Echocardiography: No    Chest x-ray: No    PFT: " No        Copy to: [facility], [facility]  Copy to: Sukumar Smith MD 1/21/2019   [facility]  [address] [place], [place] [address] [phone] [facility]    Total time spent with patient: 44 minutes with this patient today in which 25 minutes was spent in counseling/coordination of care and going over planned testing and recommendations.

## 2019-01-21 NOTE — PATIENT INSTRUCTIONS
"MY TREATMENT INFORMATION FOR SLEEP DISTURBANCE-  Kobe Bravo III    DOCTOR : Parag Smith MD  SLEEP CENTER :  [facility]    MY CONTACT NUMBER: [phone]        If I haven't had a sleep study yet, what can I expect?  A personal story from [facility]  [email or web address]/watch?v=AxPLmlRpnCs    Am I having a home sleep study?  Here is a video in case you get home and want to make sure you have done it correctly  https://www.[employer].com/watch?v=NIF9C5cDmo5&feature=youtu.be    Suspected sleep apnea: Sleep study ordered.    Follow up in sleep clinic 1-2 weeks after sleep study to discuss results of sleep study and treatment options.    Patient was advised not to drive if drowsy or sleepy.    Frequently asked questions:  1. What is Obstructive Sleep Apnea (ANGELINE)? ANGELINE is the most common type of sleep apnea. Apnea literally means, \"without breath.\" It is characterized by repetitive pauses in breathing, despite continued effort to breathe, and is usually associated with a reduction in blood oxygen saturation. Apneas can last 10 to over 60 seconds. It is caused by narrowing or collapse of the upper airway as muscles relax during sleep. Severity of sleep apnea is determined by frequency of breathing events and their effect on your sleep and oxygen levels determined during sleep testing.   2. What are the consequences of ANGELINE? Symptoms include: daytime sleepiness- possibly increasing the risk of falling asleep while driving, unrefreshing/restless sleep, snoring, insomnia, waking frequently to urinate, waking with heartburn or reflux, reduced concentration and memory, and morning headaches. Other health consequences may include development of high blood pressure and other cardiovascular disease in persons who are susceptible. Untreated ANGELINE  can contribute to heart disease, stroke and diabetes.   3. What are the treatment options? In most situations, sleep apnea is a lifelong disease that must be managed with daily " therapy. Medications are not effective for sleep apnea and surgery is generally not performed until other therapies have been tried. Therapy is usually tailored to the individual patient based on many factors including your wishes as well as severity of sleep apnea and severity of obesity. Continuous Positive Airway (CPAP) is the most reliable treatment. An oral device to hold your jaw forward is usually the next most reliable option. Other options include postioning devices (to keep you off your back), weight loss, and surgery including a tongue pacing device. There is more detail about some of these options below.    Central sleep apnea is a disorder in which your breathing repeatedly stops and starts during sleep.  Central sleep apnea occurs because your brain doesn't send proper signals to the muscles that control your breathing. This condition is different from obstructive sleep apnea, in which you can't breathe normally because of upper airway obstruction. Central sleep apnea is less common than obstructive sleep apnea.  Central sleep apnea may occur as a result of other conditions, such as heart failure and stroke. Sleeping at a high altitude and pain medications also may cause central sleep apnea.        1. CPAP-  WHAT DOES IT DO AND HOW CAN I LEARN TO WEAR IT?                               BEFORE I START, CAN I WATCH A MOVIE TO GET A PLAN ON HOW TO USE CPAP?  https://www.[employer].com/watch?y=e3K14pj440Z      Continuous positive airway pressure, or CPAP, is the most effective treatment for obstructive sleep apnea. It works by blowing room air, through a mask, to hold your throat open. A decision to use CPAP is a major step forward in the pursuit of a healthier life. The successful use of CPAP will help you breathe easier, sleep better and live healthier. You can choose CPAP equipment from any durable medical equipment provider that meets your needs.  Using CPAP can be a positive experience if you keep these  "key points in mind:  1. Commitment  CPAP is not a quick fix for your problem. It involves a long-term commitment to improve your sleep and your health.    2. Communication  Stay in close communication with both your sleep doctor and your CPAP supplier. Ask lots of questions and seek help when you need it.    3. Consistency  Use CPAP all night, every night and for every nap. You will receive the maximum health benefits from CPAP when you use it every time that you sleep. This will also make it easier for your body to adjust to the treatment.    4. Correction  The first machine and mask that you try may not be the best ones for you. Work with your sleep doctor and your CPAP supplier to make corrections to your equipment selection. Ask about trying a different type of machine or mask if you have ongoing problems. Make sure that your mask is a good fit and learn to use your equipment properly.    5. Challenge  Tell a family member or close friend to ask you each morning if you used your CPAP the previous night. Have someone to challenge you to give it your best effort.    6. Connection   Your adjustment to CPAP will be easier if you are able to connect with others who use the same treatment. Ask your sleep doctor if there is a support group in your area for people who have sleep apnea, or look for one on the Internet.  7. Comfort   Increase your level of comfort by using a saline spray, decongestant or heated humidifier if CPAP irritates your nose, mouth or throat. Use your unit's \"ramp\" setting to slowly get used to the air pressure level. There may be soft pads you can buy that will fit over your mask straps. Look on www.CPAP.com for accessories that can help make CPAP use more comfortable.  8. Cleaning   Clean your mask, tubing and headgear on a regular basis. Put this time in your schedule so that you don't forget to do it. Check and replace the filters for your CPAP unit and humidifier.    9. Completion   Although " you are never finished with CPAP therapy, you should reward yourself by celebrating the completion of your first month of treatment. Expect this first month to be your hardest period of adjustment. It will involve some trial and error as you find the machine, mask and pressure settings that are right for you.    10. Continuation  After your first month of treatment, continue to make a daily commitment to use your CPAP all night, every night and for every nap.    CPAP-Tips to starting with success:  Begin using your CPAP for short periods of time during the day while you watch TV or read.    Use CPAP every night and for every nap. Using it less often reduces the health benefits and makes it harder for your body to get used to it.    Make small adjustments to your mask, tubing, straps and headgear until you get the right fit. Tightening the mask may actually worsen the leak.  If it leaves significant marks on your face or irritates the bridge of your nose, it may not be the best mask for you.  Speak with the person who supplied the mask and consider trying other masks. Insurances will allow you to try different masks during the first month of starting CPAP.  Insurance also covers a new mask, hose and filter about every 6 months.    Use a saline nasal spray to ease mild nasal congestion. Neti-Pot or saline nasal rinses may also help. Nasal gel sprays can help reduce nasal dryness.  Biotene mouthwash can be helpful to protect your teeth if you experience frequent dry mouth.  Dry mouth may be a sign of air escaping out of your mouth or out of the mask in the case of a full face mask.  Speak with your provider if you expect that is the case.     Take a nasal decongestant to relieve more severe nasal or sinus congestion.  Do not use Afrin (oxymetazoline) nasal spray more than 3 days in a row.  Speak with your sleep doctor if your nasal congestion is chronic.    Use a heated humidifier that fits your CPAP model to enhance  your breathing comfort. Adjust the heat setting up if you get a dry nose or throat, down if you get condensation in the hose or mask.  Position the CPAP lower than you so that any condensation in the hose drains back into the machine rather than towards the mask.    Try a system that uses nasal pillows if traditional masks give you problems.    Clean your mask, tubing and headgear once a week. Make sure the equipment dries fully.    Regularly check and replace the filters for your CPAP unit and humidifier.    Work closely with your sleep provider and your CPAP supplier to make sure that you have the machine, mask and air pressure setting that works best for you. It is better to stop using it and call your provider to solve problems than to lay awake all night frustrated with the device.    BESIDES CPAP, WHAT OTHER THERAPIES ARE THERE?      Positioning Device  Positioning devices are generally used when sleep apnea is mild and only occurs on your back.This example shows a pillow that straps around the waist. It may be appropriate for those whose sleep study shows milder sleep apnea that occurs primarily when lying flat on one's back. Preliminary studies have shown benefit but effectiveness at home may need to be verified by a home sleep test. These devices are generally not covered by medical insurance.                      Oral Appliance  What is oral appliance therapy?  An oral appliance is a small acrylic device that fits over the upper and lower teeth or tongue (similar to an orthodontic retainer or a mouth guard). This device slightly advances the lower jaw or tongue, which moves the base of the tongue forward, opens the airway, improves breathing and can effectively treat snoring and obstructive sleep apnea sleep apnea. The appliance is fabricated and customized by a qualified dentist with experience in treating snoring and sleep apnea. Oral appliances are usually well tolerated and have relatively high  compliance by patients1, 2, 3.  When is an oral appliance indicated?  Oral appliance therapy is recommended as a first-line treatment for patients with primary snoring, mild sleep apnea, and for patients with moderate sleep apnea who prefer appliance therapy to use of CPAP4, 5. Severity of sleep apnea is determined by sleep testing and is based on the number of respiratory events per hour of sleep.   How successful is oral appliance therapy?  The success rate of oral appliance therapy in patients with mild sleep apnea is 75-80% while in patients with moderate sleep apnea it is 50-70%. The chance of success in patients with severe sleep apnea is 40-50%. The research also shows that oral appliances have a beneficial effect on the cardiovascular health of ANGELINE patients at the same magnitude as CPAP therapy7.  Oral appliances should be a second-line treatment in cases of severe sleep apnea, but if not completely successful then a combination therapy utilizing CPAP plus oral appliance therapy may be effective. Oral appliances tend to be effective in a broad range of patients although studies show that the patients who have the highest success are females, younger patients, those with milder disease, and less severe obesity. 3, 6.   The chances of success are lower in patients who have more severe ANGELINE, are older, and those who are morbidly obese.     Example of an oral appliance   Finding a dentist that practices dental sleep medicine  Specific training is available through the American Academy of Dental Sleep Medicine for dentists interested in working in the field of sleep. To find a dentist who is educated in the field of sleep and the use of oral appliances, near you, visit the Web site of the American Academy of Dental Sleep Medicine; also see   http://www.accpstorage.org/newOrganization/patients/oralAppliances.pdf  To search for a dentist certified in these practices:  Http://aadsm.org/FindADentist.aspx?1  1.  Brandan, et al. Objectively measured vs self-reported compliance during oral appliance therapy for sleep-disordered breathing. Chest 2013; 144(5): 5757-2941.  2. Delmar, et al. Objective measurement of compliance during oral appliance therapy for sleep-disordered breathing. Thorax 2013; 68(1): 91-96.  3. Escobar et al. Mandibular advancement devices in 620 men and women with ANGELINE and snoring: tolerability and predictors of treatment success. Chest 2004; 125: 1130-6735.  4. Wanda et al. Oral appliances for snoring and ANGELINE: a review. Sleep 2006; 29: 244-262.  5. Maryse et al. Oral appliance treatment for ANGELINE: an update. J Clin Sleep Med 2014; 10(2): 215-227.  6. Alma et al. Predictors of OSAH treatment outcome. J Dent Res 2007; 86: 7226-3152.    Nasal Valves                 Nasal valves may not be effective if you have frequent nasal congestion or have difficulty breathing through your nose. They may be an option for mild apnea if other options are not well tolerated. The efficacy of these devices is generally less than CPAP or oral appliances.      Weight Loss:    Weight management is a personal decision.  If you are interested in exploring weight loss strategies, the following discussion covers the impact on weight loss on sleep apnea and the approaches that may be successful.    Weight loss decreases severity of sleep apnea in most people with obesity. For those with mild obesity who have developed snoring with weight gain, even 15-30 pound weight loss can improve and occasionally eliminate sleep apnea.  Structured and life-long dietary and health habits are necessary to lose weight and keep healthier weight levels.     Though there may be significant health benefits from weight loss, long-term weight loss is very difficult to achieve- studies show success with dietary management in less than 10% of people. In addition, substantial weight loss may require years of dietary control and may be  difficult if patients have severe obesity. In these cases, surgical management may be considered.  Finally, older individuals who have tolerated obesity without health complications may be less likely to benefit from weight loss strategies.    Your BMI is Body mass index is 33.56 kg/m .  Body mass index (BMI) is one way to tell whether you are at a healthy weight, overweight, or obese. It measures your weight in relation to your height.  A BMI of 18.5 to 24.9 is in the healthy range. A person with a BMI of 25 to 29.9 is considered overweight, and someone with a BMI of 30 or greater is considered obese. More than two-thirds of American adults are considered overweight or obese.  Being overweight or obese increases the risk for further weight gain. Excess weight may lead to heart disease and diabetes.  Creating and following plans for healthy eating and physical activity may help you improve your health.  Weight control is part of healthy lifestyle and includes exercise, emotional health, and healthy eating habits. Careful eating habits lifelong are the mainstay of weight control. Though there are significant health benefits from weight loss, long-term weight loss with diet alone may be very difficult to achieve- studies show long-term success with dietary management in less than 10% of people. Attaining a healthy weight may be especially difficult to achieve in those with severe obesity. In some cases, medications, devices and surgical management might be considered.  What can you do?  If you are overweight or obese and are interested in methods for weight loss, you should discuss this with your provider.     Consider reducing daily calorie intake by 500 calories.     Keep a food journal.     Avoiding skipping meals, consider cutting portions instead.    Diet combined with exercise helps maintain muscle while optimizing fat loss. Strength training is particularly important for building and maintaining muscle mass.  Exercise helps reduce stress, increase energy, and improves fitness. Increasing exercise without diet control, however, may not burn enough calories to loose weight.       Start walking three days a week 10-20 minutes at a time    Work towards walking thirty minutes five days a week     Eventually, increase the speed of your walking for 1-2 minutes at time    In addition, we recommend that you review healthy lifestyles and methods for weight loss available through the National Institutes of Health patient information sites:  http://win.niddk.nih.gov/publications/index.htm    And look into health and wellness programs that may be available through your health insurance provider, employer, local community center, or ru club.    Weight management plan: Patient was referred to their PCP to discuss a diet and exercise plan.    Surgery:    Upper Airway Surgery for ANGELINE  Surgery for ANGELINE is a second-line treatment option in the management of sleep apnea.  Surgery should be considered for patients who are having a difficult time tolerating CPAP.    Surgery for ANGELINE is directed at areas that are responsible for narrowing or complete obstruction of the airway during sleep.  There are a wide range of procedures available to enlarge and/or stabilize the airway to prevent blockage of breathing in the three major areas where it can occur: the palate, tongue, and nasal regions.  Successful surgical treatment depends on the accurate identification of the factors responsible for obstructive sleep apnea in each person.  A personalized approach is required because there is no single treatment that works well for everyone.  Because of anatomic variation, consultation with an examination by a sleep surgeon is a critical first step in determining what surgical options are best for each patient.  In some cases, examination during sedation may be recommended in order to guide the selection of procedures.  Patients will be counseled about  risks and benefits as well as the typical recovery course after surgery. Surgery is typically not a cure for a person s ANGELINE.  However, surgery will often significantly improve one s ANGELINE severity (termed  success rate ).  Even in the absence of a cure, surgery will decrease the cardiovascular risk associated with OSA7; improve overall quality of life8 (sleepiness, functionality, sleep quality, etc).          Palate Procedures:  Patients with ANGELINE often have narrowing of their airway in the region of their tonsils and uvula.  The goals of palate procedures are to widen the airway in this region as well as to help the tissues resist collapse.  Modern palate procedure techniques focus on tissue conservation and soft tissue rearrangement, rather than tissue removal.  Often the uvula is preserved in this procedure. Residual sleep apnea is common in patient after pharyngoplasty with an average reduction in sleep apnea events of 33%2.      Tongue Procedures:  While patients are awake, the muscles that surround the throat are active and keep this region open for breathing. These muscles relax during sleep, allowing the tongue and other structures to collapse and block breathing.  There are several different tongue procedures available.  Selection of a tongue base procedure depends on characteristics seen on physical exam.  Generally, procedures are aimed at removing bulky tissues in this area or preventing the back of the tongue from falling back during sleep.  Success rates for tongue surgery range from 50-62%3.    Hypoglossal Nerve Stimulation:  Hypoglossal nerve stimulation has recently received approval from the [place] Food and Drug Administration for the treatment of obstructive sleep apnea.  This is based on research showing that the system was safe and effective in treating sleep apnea6.  Results showed that the median AHI score decreased 68%, from 29.3 to 9.0. This therapy uses an implant system that senses  breathing patterns and delivers mild stimulation to airway muscles, which keeps the airway open during sleep.  The system consists of three fully implanted components: a small generator (similar in size to a pacemaker), a breathing sensor, and a stimulation lead.  Using a small handheld remote, a patient turns the therapy on before bed and off upon awakening.    Candidates for this device must be greater than 22 years of age, have moderate to severe ANGELINE (AHI between 20-65), BMI less than 32, have tried CPAP/oral appliance without success, and have appropriate upper airway anatomy (determined by a sleep endoscopy performed by Dr. Warren).    Hypoglossal Nerve Stimulation Pathway:    The sleep surgeon s office will work with the patient through the insurance prior-authorization process (including communications and appeals).    Nasal Procedures:  Nasal obstruction can interfere with nasal breathing during the day and night.  Studies have shown that relief of nasal obstruction can improve the ability of some patients to tolerate positive airway pressure therapy for obstructive sleep apnea1.  Treatment options include medications such as nasal saline, topical corticosteroid and antihistamine sprays, and oral medications such as antihistamines or decongestants. Non-surgical treatments can include external nasal dilators for selected patients. If these are not successful by themselves, surgery can improve the nasal airway either alone or in combination with these other options.      Combination Procedures:  Combination of surgical procedures and other treatments may be recommended, particularly if patients have more than one area of narrowing or persistent positional disease.  The success rate of combination surgery ranges from 66-80%2,3.      1. Divya MOLINA. The Role of the Nose in Snoring and Obstructive Sleep Apnoea: An Update.  Eur Arch Otorhinolaryngol. 2011; 268: 1365-73.  2.  Bhavna MADRIGAL; Ricky GARIBAY; Amira AYALA;  Pallanch JF; Polina MB; Galdino SG; Mario PLASENCIA. Surgical modifications of the upper airway for obstructive sleep apnea in adults: a systematic review and meta-analysis. SLEEP 2010;33(10):8091-8116. Shelton SWAIN. Hypopharyngeal surgery in obstructive sleep apnea: an evidence-based medicine review.  Arch Otolaryngol Head Neck Surg. 2006 Feb;132(2):206-13.  3. Aryan YH1, Garfield Y, Raul EMORY. The efficacy of anatomically based multilevel surgery for obstructive sleep apnea. Otolaryngol Head Neck Surg. 2003 Oct;129(4):327-35.  4. Shelton SWAIN, Goldberg A. Hypopharyngeal Surgery in Obstructive Sleep Apnea: An Evidence-Based Medicine Review. Arch Otolaryngol Head Neck Surg. 2006 Feb;132(2):206-13.  5. Carlos CASAREZ et al. Upper-Airway Stimulation for Obstructive Sleep Apnea.  N Engl J Med. 2014 Jan 9;370(2):139-49.  6. Bonny Y et al. Increased Incidence of Cardiovascular Disease in Middle-aged Men with Obstructive Sleep Apnea. Am J Respir Crit Care Med; 2002 166: 159-165  7. Avalos EM et al. Studying Life Effects and Effectiveness of Palatopharyngoplasty (SLEEP) study: Subjective Outcomes of Isolated Uvulopalatopharyngoplasty. Otolaryngol Head Neck Surg. 2011; 144: 623-631.  8.   Your blood pressure was checked while you were in clinic today.  Please read the guidelines below about what these numbers mean and what you should do about them.  Your systolic blood pressure is the top number.  This is the pressure when the heart is pumping.  Your diastolic blood pressure is the bottom number.  This is the pressure in between beats.  If your systolic blood pressure is less than 120 and your diastolic blood pressure is less than 80, then your blood pressure is normal. There is nothing more that you need to do about it  If your systolic blood pressure is 120-139 or your diastolic blood pressure is 80-89, your blood pressure may be higher than it should be.  You should have your blood pressure re-checked within a year by a primary care  provider.  If your systolic blood pressure is 140 or greater or your diastolic blood pressure is 90 or greater, you may have high blood pressure.  High blood pressure is treatable, but if left untreated over time it can put you at risk for heart attack, stroke, or kidney failure.  You should have your blood pressure re-checked by a primary care provider within the next four weeks.    Good Sleep hygiene tips (American Academy of Sleep Medicine):  Maintain a regular sleep-wake routine    Go to bed at the same time. Wake up at the same time. Ideally, your schedule will remain the same (+/- 20 minutes) every night of the week.  Avoid naps if possible    Naps decrease the  Sleep Debt  that is so necessary for easy sleep onset.    Each of us needs a certain amount of sleep per 24-hour period. We need that amount, and we don t need more than that.    When we take naps, it decreases the amount of sleep that we need the next night - which may cause sleep fragmentation and difficulty initiating sleep, and may lead to insomnia.  Don t stay in bed awake for more than 15-20 minutes (Stimulus control)    If you find your mind racing, or worrying about not being able to sleep during the middle of the night, get out of bed, and sit in a chair in the dark. Do your mind racing in the chair until you are sleepy, then return to bed. No TV, or phone/Ipad, or computer or internet or eat during these periods! That will just stimulate you more than desired.    If this happens several times during the night, that is OK. Just maintain your regular wake time, and try to avoid naps.  Don t watch TV, phone, computer, video games or read in bed or eat in bed.    When you watch TV or read in bed or eat in bed, you associate the bed with wakefulness.    The bed is reserved for two things - sleep and hanky panky.  Drink caffeinated drinks with caution    The effects of caffeine may last for several hours after ingestion. Caffeine can fragment  sleep, and cause difficulty initiating sleep. If you drink caffeine, use it only before noon.    Remember that soda and tea contain caffeine as well.  Avoid inappropriate substances that interfere with sleep    Cigarettes, alcohol, and over-the-counter medications may cause fragmented sleep.  Exercise regularly    Exercise promotes continuous sleep.    Rigorous exercise (close to bedtime cause) circulates endorphins into the body which may cause difficulty initiating sleep.   Have a quiet, comfortable bedroom    Set your bedroom thermostat at a comfortable temperature. Generally, a little cooler is better than a little warmer.    Turn off the TV and other extraneous noise that may disrupt sleep. Background  white noise  like a fan is OK.    If your pets awaken you, keep them outside the bedroom.    Your bedroom should be dark. Turn off bright lights.    Have a comfortable mattress.  If you are a  clock watcher  at night, hide the clock.      Have a comfortable pre-bedtime routine    A warm bath, shower    Meditation, or quiet time    Wind-down 20 minutes prior of bedtime.

## 2019-01-30 ENCOUNTER — OFFICE VISIT (OUTPATIENT)
Dept: SLEEP MEDICINE | Facility: CLINIC | Age: 44
End: 2019-01-30
Payer: COMMERCIAL

## 2019-01-30 DIAGNOSIS — R06.83 SNORING: ICD-10-CM

## 2019-01-30 DIAGNOSIS — G47.9 SLEEP DISTURBANCE: Primary | ICD-10-CM

## 2019-01-30 PROCEDURE — G0399 HOME SLEEP TEST/TYPE 3 PORTA: HCPCS | Performed by: INTERNAL MEDICINE

## 2019-01-31 ENCOUNTER — DOCUMENTATION ONLY (OUTPATIENT)
Dept: SLEEP MEDICINE | Facility: CLINIC | Age: 44
End: 2019-01-31
Payer: COMMERCIAL

## 2019-01-31 NOTE — PROCEDURES
"  [facility] Report  ===========================    Patient Information:  --------------------  Kobe ESCOBAR  Patient ID:  [id number]   :  1975  Recording date:  2019       Indication of the sleep study: Kobe Bravo III is a 43 year old male with no significant past medical history who was seen at the [facility] in [place] with complains of loud snoring, witnessed apnea, waking up gasping air, frequent awakening at night with some early awakening, restless sleep, non restorative sleep, dry mouth and throat for 6 months. Ht 1.74 m (5' 8.5\")   Wt 101.6 kg (224 lb)   SpO2 98%   BMI 33.56 kg/m .      Recording Information:  ----------------------  This was a Type 3 unattended sleep study (measuring flow, effort, heart rate and pulse oximetry) performed at home. Please refer to EPIC procedure for detailed scoring report.   This study was considered adequate based on > 4 hours of quality oximetry and respiratory recording. As specified by the AASM Manual for the Scoring of Sleep and Associated events, version 2.3, Rule VIII.D 1B, 4% oxygen desaturation scoring for hypopneas is used as a standard of care on all home sleep apnea testing.  The severity of sleep disordered breathing can be underestimated during portable testing because the apnea/hypopnea index is calculated using total recording time rather than total sleep time.  Recording date:  2019   Recording duration:  519.8 minutes  Time in bed:  347.8 minutes  Estimated sleep efficiency was 96.5 %.   Time spent in supine position:  81.5 % of total bed time  The test quality was: adequate for interpretation  The test duration was: adequate for interpretation  Respiratory Analysis:  ---------------------  AHI: 10.9 /hour  AHI (supine):  11.9 /hour  AHI (non-supine):  6.5 /hour  REINALDO: 5.0 /hour (Number of oxygen desaturations per hour)  Snore index: 28.3 (percentage of time spent snoring versus the total time spent " in bed)  Central apnea index:  2.6 / hour    The sleep study demonstrated mild sleep disordered breathing which was characterized predominantly by obstructive apneas and hypopneas. The sleep-disordered breathing was predominantly in supine body position.     Oximetry Analysis:  ------------------  Baseline oxygen saturation during sleep was normal.   Lowest oxygen saturation:  80.0 %  Majority of the sleep time spent with oxygen saturation greater than 90%.   6.0% of the total recording time was spent with oxygen saturation less than 90%.   Time Spent oxygen saturation below 88% was 6 minutes, some was due to motion artifact.    Cardiac Analysis:  -----------------  Maximum pulse rate was 196.0 /minute and minimal pulse rate was 33.0/minute. Time spent above 100 bpm was 0.1 minutes and time spent below 40 bpm was 43.8 minutes.    Diagnosis:  ==========  Mild obstructive sleep apnea G47.33    Recommendations:   ================    1. Based on the presence of the obstructive sleep apnea, treatment could be empirically initiated with a trial of Auto-titrating PAP therapy with a range of 5 - 15 cmH2O. Recommend clinical follow up with sleep management team after using PAP for 4-6 weeks for coaching and effectiveness and measures.    2. Patient may be a candidate for dental appliance through referral to Sleep Dentistry for the treatment of obstructive sleep apnea and/or socially disruptive snoring, then patient may be a candidate for dental appliance through referral to Sleep Dentistry.    3. If devices are not acceptable or effective, patient may benefit from evaluation of possible surgical options.  If he/she is interested, would recommend referral to specialized ENT-Sleep provider.    4. Recommend optimizing regular wake-sleep schedule and avoiding sleep deprivation.    Other Recommendations:  ======================  1. Start weight loss program if BMI > 25.    2. Avoid sedating medications, including narcotics and  alcohol, as these may exacerbate sleep apnea and/or if underlying respiratory disorders.     3. Positional therapy by avoiding sleep in supine position.    4. Avoid driving when drowsy    5. Follow up primary care doctor and/or referring physician as scheduled.      Electronically signed by:      Parag Smith MD  Sleep Medicine Physician  LANDON Mccabeomate, Sleep Medicine and Internal Medicine  [facility].

## 2019-01-31 NOTE — PROGRESS NOTES
This HSAT was performed using a Noxturnal T3 device which recorded snore, sound, movement activity, body position, nasal pressure, oronasal thermal airflow, pulse, oximetry and both chest and abdominal respiratory effort. HSAT data was restricted to the time patient states they were in bed.     HSAT was scored using 1B 4% hypopnea rule.     AHI: 10.9. Snoring was reported as loud.  Time with SpO2 below 89% was 6.0 minutes.   Overall signal quality was good     Pt will follow up with sleep provider to determine appropriate therapy.     Ordering Sarah JACOBSON Abdullahi I, MD Charles O. BA, RUST, RST System Clinical Specialist 01/31/2019

## 2019-02-18 ENCOUNTER — OFFICE VISIT (OUTPATIENT)
Dept: SLEEP MEDICINE | Facility: CLINIC | Age: 44
End: 2019-02-18
Payer: COMMERCIAL

## 2019-02-18 VITALS
WEIGHT: 224 LBS | RESPIRATION RATE: 15 BRPM | HEIGHT: 69 IN | OXYGEN SATURATION: 96 % | HEART RATE: 86 BPM | DIASTOLIC BLOOD PRESSURE: 75 MMHG | BODY MASS INDEX: 33.18 KG/M2 | SYSTOLIC BLOOD PRESSURE: 125 MMHG

## 2019-02-18 DIAGNOSIS — G47.33 OSA (OBSTRUCTIVE SLEEP APNEA): Primary | ICD-10-CM

## 2019-02-18 DIAGNOSIS — E66.811 OBESITY (BMI 30.0-34.9): ICD-10-CM

## 2019-02-18 PROCEDURE — 99213 OFFICE O/P EST LOW 20 MIN: CPT | Performed by: INTERNAL MEDICINE

## 2019-02-18 ASSESSMENT — MIFFLIN-ST. JEOR: SCORE: 1903.5

## 2019-02-18 NOTE — PATIENT INSTRUCTIONS
"MY TREATMENT INFORMATION FOR SLEEP APNEA-  Kobe Bravo III    MY CONTACT NUMBERS ARE:  [phone]  DOCTOR : Parag ALCALA [facility]  SLEEP CENTER :  [place]    You have sleep apnea/snoring: oral appliance is recommended for you.    Patient was advised not to drive if drowsy or sleepy.    Frequently asked questions:  1. What is Obstructive Sleep Apnea (ANGELINE)? ANGELINE is the most common type of sleep apnea. Apnea literally means, \"without breath.\" It is characterized by repetitive pauses in breathing, despite continued effort to breathe, and is usually associated with a reduction in blood oxygen saturation. Apneas can last 10 to over 60 seconds. It is caused by narrowing or collapse of the upper airway as muscles relax during sleep. Severity of sleep apnea is determined by frequency of breathing events and their effect on your sleep and oxygen levels determined during sleep testing.   2. What are the consequences of ANGELINE? Symptoms include: daytime sleepiness- possibly increasing the risk of falling asleep while driving, unrefreshing/restless sleep, snoring, insomnia, waking frequently to urinate, waking with heartburn or reflux, reduced concentration and memory, and morning headaches. Other health consequences may include development of high blood pressure and other cardiovascular disease in persons who are susceptible. Untreated ANGELINE  can contribute to heart disease, stroke and diabetes.   3. What are the treatment options? In most situations, sleep apnea is a lifelong disease that must be managed with daily therapy. Medications are not effective for sleep apnea and surgery is generally not performed until other therapies have been tried. Therapy is usually tailored to the individual patient based on many factors including your wishes as well as severity of sleep apnea and severity of obesity. Continuous Positive Airway (CPAP) is the most reliable treatment. An oral device to hold your jaw forward is usually    Oral " Appliance  What is oral appliance therapy?  An oral appliance is a small acrylic device that fits over the upper and lower teeth or tongue (similar to an orthodontic retainer or a mouth guard). This device slightly advances the lower jaw or tongue, which moves the base of the tongue forward, opens the airway, improves breathing and can effectively treat snoring and obstructive sleep apnea sleep apnea. The appliance is fabricated and customized by a qualified dentist with experience in treating snoring and sleep apnea. Oral appliances are usually well tolerated and have relatively high compliance by patients1, 2, 3.  When is an oral appliance indicated?  Oral appliance therapy is recommended as a first-line treatment for patients with primary snoring, mild sleep apnea, and for patients with moderate sleep apnea who prefer appliance therapy to use of CPAP4, 5. Severity of sleep apnea is determined by sleep testing and is based on the number of respiratory events per hour of sleep.   How successful is oral appliance therapy?  The success rate of oral appliance therapy in patients with mild sleep apnea is 75-80% while in patients with moderate sleep apnea it is 50-70%. The chance of success in patients with severe sleep apnea is 40-50%. The research also shows that oral appliances have a beneficial effect on the cardiovascular health of ANGELINE patients at the same magnitude as CPAP therapy7.  Oral appliances should be a second-line treatment in cases of severe sleep apnea, but if not completely successful then a combination therapy utilizing CPAP plus oral appliance therapy may be effective. Oral appliances tend to be effective in a broad range of patients although studies show that the patients who have the highest success are females, younger patients, those with milder disease, and less severe obesity. 3, 6.   The chances of success are lower in patients who have more severe ANGELINE, are older, and those who are morbidly  obese.     Example of an oral appliance   Finding a dentist that practices dental sleep medicine  Specific training is available through the American Academy of Dental Sleep Medicine for dentists interested in working in the field of sleep. To find a dentist who is educated in the field of sleep and the use of oral appliances, near you, visit the Web site of the American Academy of Dental Sleep Medicine; also see   http://www.accpstorage.org/newOrganization/patients/oralAppliances.pdf  To search for a dentist certified in these practices:  Http://aadsm.org/FindADentist.aspx?1  1. Brandan et al. Objectively measured vs self-reported compliance during oral appliance therapy for sleep-disordered breathing. Chest 2013; 144(5): 9136-9916.  2. Delmar et al. Objective measurement of compliance during oral appliance therapy for sleep-disordered breathing. Thorax 2013; 68(1): 91-96.  3. Escobar, et al. Mandibular advancement devices in 620 men and women with ANGELINE and snoring: tolerability and predictors of treatment success. Chest 2004; 125: 2007-0043.  4. Wanda, et al. Oral appliances for snoring and ANGELINE: a review. Sleep 2006; 29: 244-262.  5. Maryse et al. Oral appliance treatment for ANGELINE: an update. J Clin Sleep Med 2014; 10(2): 215-227.  6. Alma et al. Predictors of OSAH treatment outcome. J Dent Res 2007; 86: 3132-9088.    Oral Appliance  These are examples of two of many custom-made devices that are more likely to work in mild sleep apnea                                                      Oral appliances are dental mouth pieces that fit very much like a sports mouth guards or removable orthodontic retainers. They are used to treat snoring  And obstructive sleep apnea . The device prevents the airway from collapsing by either holding the tongue or supporting the jaw in a forward position. Since oral appliances are non-invasive and easy to use, they may be considered as an early treatment option.  Oral appliance therapy (OAT) involves the customization, selection, fabrication, fitting, adjustments and long-term follow-up care of specially designed oral devices, worn during sleep, which reposition the lower jaw and tongue base forward to maintain an open airway.  Custom made oral appliances are proven to be more effective than over-the-counter devices. Therefore, the over-the-counter devices are recommended not to be used as a screening tool nor as a therapeutic option.     Who gets a dental device?  Oral appliance therapy can be used as an alternative to  CPAP therapy for the treatment of mild to moderate sleep apnea and for those patients who prefer OAT to CPAP. Oral appliance therapy is a first line therapy for the treatment of primary snoring. Additionally, OAT is an option for those that cannot tolerate CPAP as therapy or who have experienced insufficient surgical results.                 Possible side effects?  Frequent but minor side effects include: excessive salivation, dry mouth, discomfort of teeth and jaw and temporary changes in the patient s bite.  Potential complications include: jaw pain, permanent occlusal changes and TMJ symptoms.  The above mentioned side effects and complications can be recognized and managed by dentists trained in dental sleep medicine.   Finding a dentist that practices dental sleep medicine  Specific training is available through the American Academy of Dental Sleep Medicine for dentists interested in working in the field of sleep. To find a dentist who is educated in the field of sleep and the use of oral appliances, near you, visit the Web site of the American Academy of Dental Sleep Medicine; also see http://www.accpstorage.org/newOrganization/patients/oralAppliances.pdf   To search for a dentist certified in these practices:  Http://aadsm.org/FindADentist.aspx?1  Http://www.accpstorage.org/newOrganization/patients/oralAppliances.pdf            Your BMI is Body mass  index is 33.56 kg/m .  Weight management is a personal decision.  If you are interested in exploring weight loss strategies, the following discussion covers the approaches that may be successful. Body mass index (BMI) is one way to tell whether you are at a healthy weight, overweight, or obese. It measures your weight in relation to your height.  A BMI of 18.5 to 24.9 is in the healthy range. A person with a BMI of 25 to 29.9 is considered overweight, and someone with a BMI of 30 or greater is considered obese. More than two-thirds of American adults are considered overweight or obese.  Being overweight or obese increases the risk for further weight gain. Excess weight may lead to heart disease and diabetes.  Creating and following plans for healthy eating and physical activity may help you improve your health.  Weight control is part of healthy lifestyle and includes exercise, emotional health, and healthy eating habits. Careful eating habits lifelong are the mainstay of weight control. Though there are significant health benefits from weight loss, long-term weight loss with diet alone may be very difficult to achieve- studies show long-term success with dietary management in less than 10% of people. Attaining a healthy weight may be especially difficult to achieve in those with severe obesity. In some cases, medications, devices and surgical management might be considered.  What can you do?  If you are overweight or obese and are interested in methods for weight loss, you should discuss this with your provider.     Consider reducing daily calorie intake by 500 calories.     Keep a food journal.     Avoiding skipping meals, consider cutting portions instead.    Diet combined with exercise helps maintain muscle while optimizing fat loss. Strength training is particularly important for building and maintaining muscle mass. Exercise helps reduce stress, increase energy, and improves fitness. Increasing exercise  without diet control, however, may not burn enough calories to loose weight.       Start walking three days a week 10-20 minutes at a time    Work towards walking thirty minutes five days a week     Eventually, increase the speed of your walking for 1-2 minutes at time    In addition, we recommend that you review healthy lifestyles and methods for weight loss available through the National Institutes of Health patient information sites:  http://win.niddk.nih.gov/publications/index.htm    And look into health and wellness programs that may be available through your health insurance provider, employer, local community center, or ru club.    Weight management plan: Patient was referred to their PCP to discuss a diet and exercise plan.     Your blood pressure was checked while you were in clinic today.  Please read the guidelines below about what these numbers mean and what you should do about them.  Your systolic blood pressure is the top number.  This is the pressure when the heart is pumping.  Your diastolic blood pressure is the bottom number.  This is the pressure in between beats.  If your systolic blood pressure is less than 120 and your diastolic blood pressure is less than 80, then your blood pressure is normal. There is nothing more that you need to do about it  If your systolic blood pressure is 120-139 or your diastolic blood pressure is 80-89, your blood pressure may be higher than it should be.  You should have your blood pressure re-checked within a year by a primary care provider.  If your systolic blood pressure is 140 or greater or your diastolic blood pressure is 90 or greater, you may have high blood pressure.  High blood pressure is treatable, but if left untreated over time it can put you at risk for heart attack, stroke, or kidney failure.  You should have your blood pressure re-checked by a primary care provider within the next four weeks.

## 2019-02-18 NOTE — NURSING NOTE
"Chief Complaint   Patient presents with     RECHECK     f/u Hst       Initial /75   Pulse 86   Resp 15   Ht 1.74 m (5' 8.5\")   Wt 101.6 kg (224 lb)   SpO2 96%   BMI 33.56 kg/m   Estimated body mass index is 33.56 kg/m  as calculated from the following:    Height as of this encounter: 1.74 m (5' 8.5\").    Weight as of this encounter: 101.6 kg (224 lb).    Medication Reconciliation: complete    Elizabeth Berumen LPN    "

## 2019-02-18 NOTE — PROGRESS NOTES
Sleep Study Follow-Up Visit:    Date on this visit: 2/18/2019    ASSESSMENT / PLAN:       ANGELINE (obstructive sleep apnea)  Obesity (BMI 30.0-34.9)  Discussed with patient the recent sleep study and treatment options, we recommend oral appliance for the treatment of his mild ANGELINE and/or socially disrupting snoring. Avoid sleeping supine position and weight loss as below.  Instructions given. Follow up 6 months after oral device use and repeat home sleep study to determine efficacy of oral device. If in-laboratory sleep study is ordered or required for oral appliance titration, make sure to bring your adjustment tool for the sleep study, and in addition will contact sleep dentist for the details of oral appliance titration.  Counseled regarding weight loss through diet modification and increased physical activity. Patient was given instuctions of weight loss and advised to follow up his/her PCP for further weight loss interventions.      All questions were answered.  The patient indicates understanding of the above issues and agrees with the plan set forth.    Orders Placed This Encounter   Procedures     SLEEP DENTAL REFERRAL       He will follow up with me in about 3-6 month(s).       BRIEF SUMMARY:    Kobe Bravo III is a 43 year old male with history of loud snoring, witnessed apnea, waking up gasping air, frequent awakening at night with some early awakening, restless sleep, non restorative sleep, dry mouth and throat for 3-6 months.  comes in today for follow-up of his sleep study done on 1/30/19 at the [facility] for result of sleep study. Please see H&P for his presenting sleep symptoms for additional details.    Home sleep study: 1/30/19   AHI 10.9/hr (supine 11.9/hr)  REINALDO 5/hr  Snore index 28.3%  Lowest O 2 saturation is 80%  Time spent O 2 saturation below 88% was 6 minutes due to motion artifact.    These findings were reviewed with the patient and copy of the sleep study result was  "given.    Past medical/surgical history, family history, social history, medications and allergies were reviewed.      Problem List:  Patient Active Problem List    Diagnosis Date Noted     CARDIOVASCULAR SCREENING; LDL GOAL LESS THAN 160 10/31/2010     Priority: Medium             Medications:     Current Outpatient Medications   Medication Sig     NO ACTIVE MEDICATIONS      zolpidem (AMBIEN) 5 MG tablet Take tablet by mouth 15 minutes prior to sleep, for Sleep Study     No current facility-administered medications for this visit.           PHYSICAL EXAMINATION:  /75   Pulse 86   Resp 15   Ht 1.74 m (5' 8.5\")   Wt 101.6 kg (224 lb)   SpO2 96%   BMI 33.56 kg/m            Data: All pertinent previous laboratory data reviewed     No results found for: PH, PHARTERIAL, PO2, [id number], SAT, PCO2, HCO3, BASEEXCESS, JOSÉ, BEB  Lab Results   Component Value Date    TSH 1.50@ 05/29/2008     Lab Results   Component Value Date    GLC 87 03/23/2018    GLC 86 07/20/2017     Lab Results   Component Value Date    HGB 15.6 10/29/2014    HGB 15.9 01/17/2012     Lab Results   Component Value Date    BUN 17 03/23/2018    BUN 23 07/20/2017    CR 0.97 03/23/2018    CR 1.02 07/20/2017     No results found for: MARGIE     Fifteen minutes spent with patient, all of which were spent face-to-face counseling, consulting, coordinating plan of care, going over sleep test results and chart review.        Parag Smith MD 2/18/2019   [facility]  [address], [place] [address]   [phone] Clinic      CC: Referred Self  Copy to: Manny Mathias  "

## 2019-03-25 DIAGNOSIS — G47.33 OSA (OBSTRUCTIVE SLEEP APNEA): Primary | ICD-10-CM

## 2019-03-25 NOTE — PROGRESS NOTES
Timur was diagnosed with mild obstructive sleep apnea and initially went to oral appliance, but now he changes his mind he wants CPAP therapy, will order out to CPAP at 5-15 cmH2O with nasal mask.  Follow-up in 2 months.

## 2019-04-02 ENCOUNTER — TELEPHONE (OUTPATIENT)
Dept: SLEEP MEDICINE | Facility: CLINIC | Age: 44
End: 2019-04-02

## 2019-04-02 NOTE — TELEPHONE ENCOUNTER
Called patient to see if he would like to schedule his PAP Setup with [facility]. [facility], [address] on 04/08/19 at 2:30 pm. Patient was given address/location information.

## 2019-04-08 ENCOUNTER — DOCUMENTATION ONLY (OUTPATIENT)
Dept: SLEEP MEDICINE | Facility: CLINIC | Age: 44
End: 2019-04-08
Payer: COMMERCIAL

## 2019-04-08 NOTE — PROGRESS NOTES
Patient was offered choice of vendor and chose [facility].  Patient Kobe Bravo III was set up at [place] on April 8, 2019. Patient received a Resmed AirSense 10 Auto. Pressures were set at 5-15 cm H2O.   Patient s ramp is 5 cm H2O for Auto and FLEX/EPR is EPR, 2.  Patient received a Resmed Mask name: [id number]  Nasal mask size Medium, heated tubing and heated humidifier.  Patient is enrolled in the STM Program and does not need to meet compliance. Patient has a follow up on patient to call and schedule with Dr. Smith.    ISRAEL SHAH

## 2019-04-11 ENCOUNTER — DOCUMENTATION ONLY (OUTPATIENT)
Dept: SLEEP MEDICINE | Facility: CLINIC | Age: 44
End: 2019-04-11

## 2019-04-11 NOTE — PROGRESS NOTES
3 DAY STM VISIT    Diagnostic AHI:   10.9 HST    Patient contacted for 3 day STM visit  Subjective measures:  Pt feels things are going ok so far.  He is able to get a few hours. He feels that he has increase work to exhale against the pressure.       Device type: Auto-CPAP  PAP settings from order::  CPAP min 5 cm  H20       CPAP max 15 cm  H20        Mask type:    Nasal Mask     Device settings from machine      Min CPAP 5.0            Max CPAP 15.0           Assessment: Nightly usage, most nights under four hours.  Action plan: Pt to have f/u 14 day STM visit.  Patient has a follow up visit scheduled:   not required by insurance.

## 2019-04-23 ENCOUNTER — DOCUMENTATION ONLY (OUTPATIENT)
Dept: SLEEP MEDICINE | Facility: CLINIC | Age: 44
End: 2019-04-23

## 2019-04-23 NOTE — PROGRESS NOTES
14  DAY STM VISIT    Diagnostic AHI:   10.9 HST    Subjective measures:  Pt changed masks yesterday.  He has had one night of usage so far.  He had issues with the mask connection to the tube coming off.       Assessment: Pt not meeting objective benchmarks for compliance  Patient failing following subjective benchmarks: mask discomfort     Action plan: pt to have 30 day STM visit.      Device type: Auto-CPAP    PAP settings: CPAP min 5.0 cm  H20       CPAP max 15.0 cm  H20        95th% pressure 7.9 cm  H20     Mask type:  Nasal Mask    Objective measures: 14 day rolling measures      Compliance  0 %      Leak  1.2 lpm  last  upload      AHI 1.14   last  upload      Average number of minutes 39      Objective measure goal  Compliance   Goal >70%  Leak   Goal < 24 lpm  AHI  Goal < 5  Usage  Goal >240

## 2019-05-08 ENCOUNTER — OFFICE VISIT (OUTPATIENT)
Dept: SLEEP MEDICINE | Facility: CLINIC | Age: 44
End: 2019-05-08
Payer: COMMERCIAL

## 2019-05-08 VITALS
BODY MASS INDEX: 32.88 KG/M2 | SYSTOLIC BLOOD PRESSURE: 126 MMHG | DIASTOLIC BLOOD PRESSURE: 72 MMHG | HEART RATE: 58 BPM | WEIGHT: 222 LBS | HEIGHT: 69 IN | OXYGEN SATURATION: 97 %

## 2019-05-08 DIAGNOSIS — E66.811 OBESITY (BMI 30.0-34.9): ICD-10-CM

## 2019-05-08 DIAGNOSIS — G47.33 OSA (OBSTRUCTIVE SLEEP APNEA): Primary | ICD-10-CM

## 2019-05-08 PROCEDURE — 99213 OFFICE O/P EST LOW 20 MIN: CPT | Performed by: INTERNAL MEDICINE

## 2019-05-08 ASSESSMENT — MIFFLIN-ST. JEOR: SCORE: 1894.49

## 2019-05-08 NOTE — NURSING NOTE
"Chief Complaint   Patient presents with     RECHECK     f/u pap use       Initial /72   Pulse 58   Ht 1.74 m (5' 8.5\")   Wt 100.7 kg (222 lb)   SpO2 97%   BMI 33.26 kg/m   Estimated body mass index is 33.26 kg/m  as calculated from the following:    Height as of this encounter: 1.74 m (5' 8.5\").    Weight as of this encounter: 100.7 kg (222 lb).    Medication Reconciliation: complete        DME:yes    ESS 3  "

## 2019-05-08 NOTE — PATIENT INSTRUCTIONS
MY TREATMENT INFORMATION FOR SLEEP APNEA-  Kobe Bravo III    MY CONTACT NUMBERS ARE:  [phone]  DOCTOR : Parag ALCALA [facility]  SLEEP CENTER :  [facility]    Your sleep apnea is controlled with auto-CPAP and you are not using a 4 hours or nights. Will change mask to full face mask for  mouth breather. Will change to his machine to fixed CPAP at pressure of 7 cmH2O.   Mask and supplies are ordered    Continue use of CPAP:  - Recommend using CPAP every night for at least 7-8 hours each night  - Daytime naps are not advised, but use CPAP if taking naps.    - Do not remove mask headgear when you go to bathroom at night, but just unplug the hose.    Objective measure goal  Compliance  Goal >70% (preferrably 100%)  Leak   Goal < 10% (less than 24 L/min)  AHI  Goal < 5 events per hour   Usage  Goal 7-8 hours.      Patient was advised not to drive if drowsy or sleepy.      Follow up in 3 months.    Your blood pressure was checked while you were in clinic today.  Please read the guidelines below about what these numbers mean and what you should do about them.  Your systolic blood pressure is the top number.  This is the pressure when the heart is pumping.  Your diastolic blood pressure is the bottom number.  This is the pressure in between beats.  If your systolic blood pressure is less than 120 and your diastolic blood pressure is less than 80, then your blood pressure is normal. There is nothing more that you need to do about it  If your systolic blood pressure is 120-139 or your diastolic blood pressure is 80-89, your blood pressure may be higher than it should be.  You should have your blood pressure re-checked within a year by a primary care provider.  If your systolic blood pressure is 140 or greater or your diastolic blood pressure is 90 or greater, you may have high blood pressure.  High blood pressure is treatable, but if left untreated over time it can put you at risk for heart attack, stroke, or kidney  failure.  You should have your blood pressure re-checked by a primary care provider within the next four weeks.  Your BMI is Body mass index is 33.26 kg/m .  Weight management is a personal decision.  If you are interested in exploring weight loss strategies, the following discussion covers the approaches that may be successful. Body mass index (BMI) is one way to tell whether you are at a healthy weight, overweight, or obese. It measures your weight in relation to your height.  A BMI of 18.5 to 24.9 is in the healthy range. A person with a BMI of 25 to 29.9 is considered overweight, and someone with a BMI of 30 or greater is considered obese. More than two-thirds of American adults are considered overweight or obese.  Being overweight or obese increases the risk for further weight gain. Excess weight may lead to heart disease and diabetes.  Creating and following plans for healthy eating and physical activity may help you improve your health.  Weight control is part of healthy lifestyle and includes exercise, emotional health, and healthy eating habits. Careful eating habits lifelong are the mainstay of weight control. Though there are significant health benefits from weight loss, long-term weight loss with diet alone may be very difficult to achieve- studies show long-term success with dietary management in less than 10% of people. Attaining a healthy weight may be especially difficult to achieve in those with severe obesity. In some cases, medications, devices and surgical management might be considered.  What can you do?  If you are overweight or obese and are interested in methods for weight loss, you should discuss this with your provider.     Consider reducing daily calorie intake by 500 calories.     Keep a food journal.     Avoiding skipping meals, consider cutting portions instead.    Diet combined with exercise helps maintain muscle while optimizing fat loss. Strength training is particularly important  for building and maintaining muscle mass. Exercise helps reduce stress, increase energy, and improves fitness. Increasing exercise without diet control, however, may not burn enough calories to loose weight.       Start walking three days a week 10-20 minutes at a time    Work towards walking thirty minutes five days a week     Eventually, increase the speed of your walking for 1-2 minutes at time    In addition, we recommend that you review healthy lifestyles and methods for weight loss available through the National Institutes of Health patient information sites:  http://win.niddk.nih.gov/publications/index.htm    And look into health and wellness programs that may be available through your health insurance provider, employer, local community center, or ru club.

## 2019-05-08 NOTE — PROGRESS NOTES
[facility]  Outpatient Sleep Medicine Follow-up  May 8, 2019      Name: Kobe Bravo III MRN# [id number]   Age: 43 year old YOB: 1975   Date of visit: May 8, 2019  Primary care provider: Manny Mathias         Assessment and Plan:     1. Obstructive Sleep Apnea:  - Non compliance of PAP use with low residual AHI. He has mask and pressure issues. Agreed to change mask to full face mask as he is mouth breather. Will change to his machine to fixed CPAP at pressure of 7 cmH2O.  - PAP Machine download is reviewed as below  - Mask and supplies are renewed  - Clinical response: poor  - Recommend using CPAP every night for at least 7-8 hours each night  - Daytime naps are not advised, but use CPAP if taking naps  - Patient was advised not to drive if drowsy or sleepy.  - Follow up in sleep clinic in 3 months.    2. Obesity: Encouraged patient to lose weight. Counseled regarding weight loss through diet modification and increased physical activity. Patient was given nstuctions of weight loss and advised to follow up her PCP for further weight loss interventions. Weight loss instructions given.    Orders Placed This Encounter   Procedures     Comprehensive DME       Summary Counseling:  New sleep schedule recommendation: given    Check out [email or web address].aasmnet.org/    All questions were answered.  The patient indicates understanding of the above issues and agrees with the plan set forth.           Chief Complaint:    Routine Follow up            History of Present Illness:     Kobe Bravo III is a 43 year old male with history of obstructive sleep apnea who comes to [facility] for follow up. Please see H&P for his presenting sleep symptoms for additional details.  Patient is diagnosed mild obstructive sleep apnea on 1/30/2019, patient initially preferred and recommended an oral appliance but he changed his mind and wanted his CPAP therapy and he was  prescribed auto-CPAP pressure of 5-15 cmH2O, and was setup at PAP therapy on 4/8/2019.   He used his PAP therapy only few nights for only for hour and half average but had low residual AHI.  He is not using his auto CPAP due to mask interface issues and feeling high pressure and difficulty exhaling. He changed two masks and the last mask is ok. He is not snoring when he uses CPAP as per wife. Patient does not need to meet compliance for his CPAP usage. He is full face mask and he uses full face mask. He denies claustrophobia.    PREVIOUS SLEEP STUDIES:  Home sleep study: 1/30/19   AHI 10.9/hr (supine 11.9/hr)  REINALDO 5/hr  Snore index 28.3%  Lowest O 2 saturation is 80%  Time spent O 2 saturation below 88% was 6 minutes due to motion artifact.    CPAP Compliance:  Dates:  4/8/2019- 5/7/2019       0 % >4hour use   Days used: 11/30  Average daily use (days used): 1.31 hrs  Leak 95 percentile: 3.3 L/min  Residual AHI: 1.2/hr  Pressure:  5-15 cmH2O  95% (90%) percentile pressure: 7.7 cmH2O (max pressure 8.1)    [place] Sleepiness Scale score today:3 /24  Pre-PAP Dav[place] Sleepiness Scale score: 4/24    PAP machine: ResMed    Interface:  Mask: Nasal mask  Mask comfort:  Chin strap: No  Leak: No  Humidity: Yes    Difficulties with therapy:   [x] Difficulty tolerating the pressure  [-] Epistaxis:   [-] Nasal congestion   [-] Dry mouth:  [x] Mouth breathing:   [-] Pain/skin breakdown:     Improvements noted with CPAP:  Not using enough hours or days    SLEEP-WAKE SCHEDULE: unchanged    Other sleep problems: None     Drowsy driving / near incidents: No    Medications that affect sleep: No            Medications:     Current Outpatient Medications   Medication Sig     NO ACTIVE MEDICATIONS      zolpidem (AMBIEN) 5 MG tablet Take tablet by mouth 15 minutes prior to sleep, for Sleep Study (Patient not taking: Reported on 5/8/2019)     No current facility-administered medications for this visit.         No Known Allergies          "Past Medical History:   No past medical history on file.          Past Surgical History:      Past Surgical History:   Procedure Laterality Date     ARTHROSCOPY KNEE WITH MEDIAL MENISCECTOMY  2012    Procedure:ARTHROSCOPY KNEE WITH MEDIAL MENISCECTOMY; Left Knee scope with Partial Medial Menisectomy  ; Surgeon:ONOFRE PATRICIA; Location:[facility] OR     ORTHOPEDIC SURGERY      marvin ACL repair     ORTHOPEDIC SURGERY      left foot fracture repair     SEPTOPLASTY       wisdom teeth[         Social History     Tobacco Use     Smoking status: Never Smoker     Smokeless tobacco: Former User   Substance Use Topics     Alcohol use: Yes     Alcohol/week: 0.0 oz     Comment: on occ       Family History   Problem Relation Age of Onset     Diabetes Father          complications of DM age 69     C.A.D. Father      Asthma Mother      Heart Disease Mother         A-fib     Alcohol/Drug Paternal Grandmother             Physical Examination:   /72   Pulse 58   Ht 1.74 m (5' 8.5\")   Wt 100.7 kg (222 lb)   SpO2 97%   BMI 33.26 kg/m              Data: All pertinent previous laboratory data reviewed     No results found for: PH, PHARTERIAL, PO2, [id number], SAT, PCO2, HCO3, BASEEXCESS, JOSÉ, BEB  Lab Results   Component Value Date    TSH 1.50@ 2008     Lab Results   Component Value Date    GLC 87 2018    GLC 86 2017     Lab Results   Component Value Date    HGB 15.6 10/29/2014    HGB 15.9 2012     Lab Results   Component Value Date    BUN 17 2018    BUN 23 2017    CR 0.97 2018    CR 1.02 2017     No results found for: MARGIE      He will follow up with me in about 3 month(s).         Copy to: Manny Mathias MD 2019     [facility]  [address] [place] [address]       Fifteen minutes spent with patient, all of which were spent face-to-face counseling, consulting, coordinating plan of care and going over " PAP download/sleep study and chart review.

## 2019-05-09 ENCOUNTER — DOCUMENTATION ONLY (OUTPATIENT)
Dept: SLEEP MEDICINE | Facility: CLINIC | Age: 44
End: 2019-05-09

## 2019-05-09 NOTE — PROGRESS NOTES
30 DAY STM VISIT    Diagnostic AHI:   10.9 HST    Data only recheck saw provider 5/8/2019 see office notes for recommendations.     Assessment: Pt not meeting objective benchmarks for compliance     Action plan:pt to follow up with DME regarding new mask and device was reset to a straight pressure of 7 cm H20   Device type: CPAP  PAP settings: CPAP min 5.0 cm  H20     CPAP max 15.0 cm  H20         95th% pressure 7.2 cm  H20   Mask type:  Full Face Mask  Objective measures: 14 day rolling measures      Compliance  0 %      Leak  5.52 lpm  last  upload      AHI 1.34   last  upload      Average number of minutes 24      Objective measure goal  Compliance   Goal >70%  Leak   Goal < 24 lpm  AHI  Goal < 5  Usage  Goal >240

## 2019-10-03 ENCOUNTER — HEALTH MAINTENANCE LETTER (OUTPATIENT)
Age: 44
End: 2019-10-03

## 2019-10-07 ENCOUNTER — DOCUMENTATION ONLY (OUTPATIENT)
Dept: SLEEP MEDICINE | Facility: CLINIC | Age: 44
End: 2019-10-07

## 2019-10-07 NOTE — PROGRESS NOTES
6 month STM    STM Recheck Visit     Diagnostic AHI:   10.9  HST    Data only recheck pt changed to a dental appliance.     Assessment: Pt not meeting objective benchmarks for compliance     Action plan:   pt to follow up per provider request       Device type: CPAP  PAP settings     CPAP fixed 7.0 cm  H20      Objective measures: 14 day rolling measures             Objective measure goal  Compliance   Goal >70%  Leak   Goal < 24 lpm  AHI  Goal < 5  Usage  Goal >240

## 2019-11-06 ENCOUNTER — OFFICE VISIT (OUTPATIENT)
Dept: URGENT CARE | Facility: URGENT CARE | Age: 44
End: 2019-11-06
Payer: COMMERCIAL

## 2019-11-06 VITALS
SYSTOLIC BLOOD PRESSURE: 132 MMHG | HEIGHT: 69 IN | OXYGEN SATURATION: 97 % | BODY MASS INDEX: 33.33 KG/M2 | DIASTOLIC BLOOD PRESSURE: 82 MMHG | WEIGHT: 225 LBS | HEART RATE: 83 BPM | TEMPERATURE: 98.4 F

## 2019-11-06 DIAGNOSIS — B35.3 TINEA PEDIS OF LEFT FOOT: Primary | ICD-10-CM

## 2019-11-06 DIAGNOSIS — L03.032 CELLULITIS OF TOE OF LEFT FOOT: ICD-10-CM

## 2019-11-06 PROCEDURE — 99214 OFFICE O/P EST MOD 30 MIN: CPT | Performed by: PHYSICIAN ASSISTANT

## 2019-11-06 RX ORDER — KETOCONAZOLE 20 MG/G
CREAM TOPICAL DAILY
Qty: 60 G | Refills: 1 | Status: SHIPPED | OUTPATIENT
Start: 2019-11-06 | End: 2020-08-10

## 2019-11-06 RX ORDER — SULFAMETHOXAZOLE/TRIMETHOPRIM 800-160 MG
1 TABLET ORAL 2 TIMES DAILY
Qty: 14 TABLET | Refills: 0 | Status: SHIPPED | OUTPATIENT
Start: 2019-11-06 | End: 2019-12-27

## 2019-11-06 ASSESSMENT — MIFFLIN-ST. JEOR: SCORE: 1903.03

## 2019-11-06 NOTE — PROGRESS NOTES
"SUBJECTIVE:  Kobe Bravo III is a 44 year old male who presents to the clinic today for a rash.  Onset of rash was 3 week(s) ago.   Rash is gradual onset.  Location of the rash: foot.  Quality/symptoms of rash: itching, painful, discharge and red   Symptoms are mild and rash seems to be worsening.  Previous history of a similar rash? No  Recent exposure history: gym    Has been using otc lotrim intermittently    Associated symptoms include: nothing.    No past medical history on file.  Current Outpatient Medications   Medication Sig Dispense Refill     NO ACTIVE MEDICATIONS        Social History     Tobacco Use     Smoking status: Never Smoker     Smokeless tobacco: Former User   Substance Use Topics     Alcohol use: Yes     Alcohol/week: 0.0 standard drinks     Comment: on occ       ROS:  10 point ROS negative except as listed above      EXAM:   /82   Pulse 83   Temp 98.4  F (36.9  C) (Tympanic)   Ht 1.74 m (5' 8.5\")   Wt 102.1 kg (225 lb)   SpO2 97%   BMI 33.71 kg/m    GENERAL: alert, no acute distress.  SKIN: tineea rash in webs of toes dorsum of great toe.   Some erythema and swelling without discharge of great toe.   MS: ROM intact  EYES: conjunctiva clear  NECK: supple, non-tender to palpation, no adenopathy noted  RESP: lungs clear to auscultation - no rales, rhonchi or wheezes  CV: regular rates and rhythm, normal S1 S2, no murmur noted  NEURO: Normal strength and tone    ASSESSMENT:  (B35.3) Tinea pedis of left foot  (primary encounter diagnosis)  (L03.032) Cellulitis of toe of left foot  Comment: athletes foot with secondary infection  Plan: ketoconazole (NIZORAL) 2 % external cream  sulfamethoxazole-trimethoprim (BACTRIM         DS/SEPTRA DS) 800-160 MG tablet      Follow up with PCP if symptoms worsen or fail to improve    Patient Instructions     Patient Education     Athlete s Foot    Athlete s foot (tinea pedis) is caused by a fungal infection in the skin. It affects the skin between " the toes, causing cracks in the skin called fissures. It can also affect the bottom of the foot where it causes dry white scales and peeling of the skin. This infection is more likely to occur when the foot is in hot, sweaty socks and shoes for long periods of time. You may feel itching and burning between your toes. This infection is treated with skin creams or medicine taken by mouth.  Home care  The following are general care guidelines:    It is important to keep the feet dry. Use absorbent cotton socks and change them if they become sweaty. Or wear an open-toe shoe or sandal. Wash the feet at least once a day with soap and water.    Apply the antifungal cream as prescribed. Some antifungal creams are available without a prescription.    It may take a week before the rash starts to improve. It can take about 3 to 4 weeks to completely clear. Continue the medicine until the rash is all gone.    Use over-the-counter antifungal powders or sprays on your feet after exposure to high-risk environments, such as public showers, gyms, and locker rooms. This can help prevent future infections. Wearing appropriate shoes in these situations can help.  Prevention  The following tips may help prevent athlete s foot:    Don't share shoes or socks with someone who has athlete's foot.    Don't walk barefoot in places where a fungal infection can spread quickly such as locker rooms, showers, and swimming pools.    Change socks regularly.    Alternate shoes to assist in drying.  Follow-up care  Follow up with your healthcare provider as recommended if the rash does not improve after 10 days of treatment, or if the rash continues to spread.  When to seek medical care  Get medical attention right away if any of the following occur:    Fever of 100.4 F (38 C) or higher, or as directed    Increasing redness or swelling of the foot    Infection comes back soon after treatment    Pus draining from cracks in the skin  Date Last Reviewed:  8/1/2016 2000-2018 [employer]. [address] [place] [address], [place] [address]. All rights reserved. This information is not intended as a substitute for professional medical care. Always follow your healthcare professional's instructions.           Patient Education     Cellulitis  Cellulitis is an infection of the deep layers of skin. A break in the skin, such as a cut or scratch, can let bacteria under the skin. If the bacteria get to deep layers of the skin, it can be serious. If not treated, cellulitis can get into the bloodstream and lymph nodes. The infection can then spread throughout the body. This causes serious illness.  Cellulitis causes the affected skin to become red, swollen, warm, and sore. The reddened areas have a visible border. An open sore may leak fluid (pus). You may have a fever, chills, and pain.  Cellulitis is treated with antibiotics taken for 7 to 10 days. An open sore may be cleaned and covered with cool wet gauze. Symptoms should get better 1 to 2 days after treatment is started. Make sure to take all the antibiotics for the full number of days until they are gone. Keep taking the medicine even if your symptoms go away.  Home care  Follow these tips:    Limit the use of the part of your body with cellulitis.     If the infection is on your leg, keep your leg raised while sitting. This will help to reduce swelling.    Take all of the antibiotic medicine exactly as directed until it is gone. Do not miss any doses, especially during the first 7 days. Don t stop taking the medicine when your symptoms get better.    Keep the affected area clean and dry.    Wash your hands with soap and warm water before and after touching your skin. Anyone else who touches your skin should also wash his or her hands. Don't share towels.  Follow-up care  Follow up with your healthcare provider, or as advised. If your infection does not go away on the first antibiotic, your healthcare provider will  prescribe a different one.  When to seek medical advice  Call your healthcare provider right away if any of these occur:    Red areas that spread    Swelling or pain that gets worse    Fluid leaking from the skin (pus)    Fever higher of 100.4  F (38.0  C) or higher after 2 days on antibiotics  Date Last Reviewed: 9/1/2016 2000-2018 [employer]. [address] [place] [address]. All rights reserved. This information is not intended as a substitute for professional medical care. Always follow your healthcare professional's instructions.

## 2019-11-06 NOTE — PATIENT INSTRUCTIONS
Patient Education     Athlete s Foot    Athlete s foot (tinea pedis) is caused by a fungal infection in the skin. It affects the skin between the toes, causing cracks in the skin called fissures. It can also affect the bottom of the foot where it causes dry white scales and peeling of the skin. This infection is more likely to occur when the foot is in hot, sweaty socks and shoes for long periods of time. You may feel itching and burning between your toes. This infection is treated with skin creams or medicine taken by mouth.  Home care  The following are general care guidelines:    It is important to keep the feet dry. Use absorbent cotton socks and change them if they become sweaty. Or wear an open-toe shoe or sandal. Wash the feet at least once a day with soap and water.    Apply the antifungal cream as prescribed. Some antifungal creams are available without a prescription.    It may take a week before the rash starts to improve. It can take about 3 to 4 weeks to completely clear. Continue the medicine until the rash is all gone.    Use over-the-counter antifungal powders or sprays on your feet after exposure to high-risk environments, such as public showers, gyms, and locker rooms. This can help prevent future infections. Wearing appropriate shoes in these situations can help.  Prevention  The following tips may help prevent athlete s foot:    Don't share shoes or socks with someone who has athlete's foot.    Don't walk barefoot in places where a fungal infection can spread quickly such as locker rooms, showers, and swimming pools.    Change socks regularly.    Alternate shoes to assist in drying.  Follow-up care  Follow up with your healthcare provider as recommended if the rash does not improve after 10 days of treatment, or if the rash continues to spread.  When to seek medical care  Get medical attention right away if any of the following occur:    Fever of 100.4 F (38 C) or higher, or as  directed    Increasing redness or swelling of the foot    Infection comes back soon after treatment    Pus draining from cracks in the skin  Date Last Reviewed: 8/1/2016 2000-2018 [employer]. [address] [place] [address], [place] [address]. All rights reserved. This information is not intended as a substitute for professional medical care. Always follow your healthcare professional's instructions.           Patient Education     Cellulitis  Cellulitis is an infection of the deep layers of skin. A break in the skin, such as a cut or scratch, can let bacteria under the skin. If the bacteria get to deep layers of the skin, it can be serious. If not treated, cellulitis can get into the bloodstream and lymph nodes. The infection can then spread throughout the body. This causes serious illness.  Cellulitis causes the affected skin to become red, swollen, warm, and sore. The reddened areas have a visible border. An open sore may leak fluid (pus). You may have a fever, chills, and pain.  Cellulitis is treated with antibiotics taken for 7 to 10 days. An open sore may be cleaned and covered with cool wet gauze. Symptoms should get better 1 to 2 days after treatment is started. Make sure to take all the antibiotics for the full number of days until they are gone. Keep taking the medicine even if your symptoms go away.  Home care  Follow these tips:    Limit the use of the part of your body with cellulitis.     If the infection is on your leg, keep your leg raised while sitting. This will help to reduce swelling.    Take all of the antibiotic medicine exactly as directed until it is gone. Do not miss any doses, especially during the first 7 days. Don t stop taking the medicine when your symptoms get better.    Keep the affected area clean and dry.    Wash your hands with soap and warm water before and after touching your skin. Anyone else who touches your skin should also wash his or her hands. Don't share  towels.  Follow-up care  Follow up with your healthcare provider, or as advised. If your infection does not go away on the first antibiotic, your healthcare provider will prescribe a different one.  When to seek medical advice  Call your healthcare provider right away if any of these occur:    Red areas that spread    Swelling or pain that gets worse    Fluid leaking from the skin (pus)    Fever higher of 100.4  F (38.0  C) or higher after 2 days on antibiotics  Date Last Reviewed: 9/1/2016 2000-2018 [employer]. [address] [place] [address], [place] [address]. All rights reserved. This information is not intended as a substitute for professional medical care. Always follow your healthcare professional's instructions.

## 2019-12-04 ENCOUNTER — OFFICE VISIT (OUTPATIENT)
Dept: PODIATRY | Facility: CLINIC | Age: 44
End: 2019-12-04
Payer: COMMERCIAL

## 2019-12-04 ENCOUNTER — TELEPHONE (OUTPATIENT)
Dept: PODIATRY | Facility: CLINIC | Age: 44
End: 2019-12-04

## 2019-12-04 VITALS
WEIGHT: 225 LBS | DIASTOLIC BLOOD PRESSURE: 74 MMHG | SYSTOLIC BLOOD PRESSURE: 128 MMHG | BODY MASS INDEX: 33.33 KG/M2 | HEIGHT: 69 IN

## 2019-12-04 DIAGNOSIS — B35.3 TINEA PEDIS OF LEFT FOOT: ICD-10-CM

## 2019-12-04 DIAGNOSIS — L74.513 HYPERHIDROSIS OF FEET: Primary | ICD-10-CM

## 2019-12-04 DIAGNOSIS — M79.672 LEFT FOOT PAIN: ICD-10-CM

## 2019-12-04 PROCEDURE — 99203 OFFICE O/P NEW LOW 30 MIN: CPT | Performed by: PODIATRIST

## 2019-12-04 RX ORDER — ACETAMINOPHEN 650 MG
TABLET, EXTENDED RELEASE ORAL PRN
Qty: 30 ML | Refills: 0 | Status: SHIPPED | OUTPATIENT
Start: 2019-12-04 | End: 2020-08-10

## 2019-12-04 RX ORDER — ITRACONAZOLE 100 MG/1
100 CAPSULE ORAL DAILY
Qty: 10 CAPSULE | Refills: 0 | Status: SHIPPED | OUTPATIENT
Start: 2019-12-04 | End: 2019-12-27

## 2019-12-04 ASSESSMENT — MIFFLIN-ST. JEOR: SCORE: 1903.03

## 2019-12-04 NOTE — PROGRESS NOTES
PATIENT HISTORY:   Kobe Bravo III is a 44 year old male who presents to clinic for persistent wounds to left foot. Notes they started about 2 months ago. Was seen in urgent care. Put on topical antifungal and oral antibiotic. Notes it helped alittle for a little bit but then came back. It does itch but is cracking and sore as well. Pain is 4/10 but can be 7/10 at its worst. Will swell. Wondering what is causing it and what can be done for it. Denies injury but notes feet sweat a lot.       Review of Systems:  Patient denies fever, chills, rash,  stiffness, limping, numbness, weakness, heart burn, blood in stool, chest pain with activity, calf pain when walking, shortness of breath with activity, chronic cough, easy bleeding/bruising, swelling of ankles, excessive thirst, fatigue, depression, anxiety.  Patient admits to wound.     PAST MEDICAL HISTORY: No past medical history on file.     PAST SURGICAL HISTORY:   Past Surgical History:   Procedure Laterality Date     ARTHROSCOPY KNEE WITH MEDIAL MENISCECTOMY  1/23/2012    Procedure:ARTHROSCOPY KNEE WITH MEDIAL MENISCECTOMY; Left Knee scope with Partial Medial Menisectomy  ; Surgeon:ONOFRE PATRICIA; Location:[facility] OR     ORTHOPEDIC SURGERY      marvin ACL repair     ORTHOPEDIC SURGERY      left foot fracture repair     SEPTOPLASTY       wisdom teeth[          MEDICATIONS:   Current Outpatient Medications:      ketoconazole (NIZORAL) 2 % external cream, Apply topically daily, Disp: 60 g, Rfl: 1     NO ACTIVE MEDICATIONS, , Disp: , Rfl:      ALLERGIES:  No Known Allergies     SOCIAL HISTORY:   Social History     Socioeconomic History     Marital status:      Spouse name: Not on file     Number of children: Not on file     Years of education: Not on file     Highest education level: Not on file   Occupational History     Not on file   Social Needs     Financial resource strain: Not on file     Food insecurity:     Worry: Not on file     Inability: Not on file      "Transportation needs:     Medical: Not on file     Non-medical: Not on file   Tobacco Use     Smoking status: Never Smoker     Smokeless tobacco: Former User   Substance and Sexual Activity     Alcohol use: Yes     Alcohol/week: 0.0 standard drinks     Comment: on occ     Drug use: No     Sexual activity: Yes     Partners: Female   Lifestyle     Physical activity:     Days per week: Not on file     Minutes per session: Not on file     Stress: Not on file   Relationships     Social connections:     Talks on phone: Not on file     Gets together: Not on file     Attends Yarsanism service: Not on file     Active member of club or organization: Not on file     Attends meetings of clubs or organizations: Not on file     Relationship status: Not on file     Intimate partner violence:     Fear of current or ex partner: Not on file     Emotionally abused: Not on file     Physically abused: Not on file     Forced sexual activity: Not on file   Other Topics Concern     Parent/sibling w/ CABG, MI or angioplasty before 65F 55M? Not Asked   Social History Narrative     Not on file        FAMILY HISTORY:   Family History   Problem Relation Age of Onset     Diabetes Father          complications of DM age 69     C.A.D. Father      Asthma Mother      Heart Disease Mother         A-fib     Alcohol/Drug Paternal Grandmother         EXAM:Vitals: /74   Ht 1.74 m (5' 8.5\")   Wt 102.1 kg (225 lb)   BMI 33.71 kg/m    BMI= Body mass index is 33.71 kg/m .    General appearance: Patient is alert and fully cooperative with history & exam.  No sign of distress is noted during the visit.     Psychiatric: Affect is pleasant & appropriate.  Patient appears motivated to improve health.     Respiratory: Breathing is regular & unlabored while sitting.     HEENT: Hearing is intact to spoken word.  Speech is clear.  No gross evidence of visual impairment that would impact ambulation.     Dermatologic: red, cracking, plaques to the " doral left 1st, 2nd, 3rd, and 5th toes. Some maceration noted inbetween toes. Serous drainage minimally.      Vascular: DP & PT pulses are intact & regular bilaterally.  No significant edema or varicosities noted.  CFT and skin temperature is normal to both lower extremities.     Neurologic: Lower extremity sensation is intact to light touch.  No evidence of weakness or contracture in the lower extremities.  No evidence of neuropathy.     Musculoskeletal: Patient is ambulatory without assistive device or brace.  No gross ankle deformity noted.  No foot or ankle joint effusion is noted.    ASSESSMENT:    Hyperhidrosis of feet  Left foot pain  Tinea pedis of left foot     PLAN:  Reviewed patient's chart in epic. Talked about athletes foot vs dermatitis. Recommend iodine at this time 1-2 x a day with a gauze roll around foot for next 2 weeks. Will also try oral antifunga. If not improved in 2 weeks, recommend biopsy of the foot.     Talked about hyperhidrosis. Recommend changing socks 2x a day. Possible prescription powder if dermatitis calms down.        Ofelia Patel DPM, Podiatry/Foot and Ankle Surgery    Weight management plan: Patient was referred to their PCP to discuss a diet and exercise plan.    Recommended to Kobe Bravo III to follow up with Primary Care provider regarding elevated blood pressure.

## 2019-12-04 NOTE — PATIENT INSTRUCTIONS
2 week follow up     Thank you for choosing [facility]!    DR. [facility]S CLIN[facility] SCHEDULE  MONDAY AM - [place] TUESDAY - [place]   [address] [address] [address] [place] [address]   [phone] / [phone] / [phone]       WEDNESDAY - [place] FRIDAY AM - [facility]   [address]   [place] [address] [address] [address]   [phone] / [phone]       FRIDAY PM - [place] SCHEDULE SURGERY: [phone] [address] BILLING QUESTIONS: [phone]   [address] [address] AFTER HOURS: [phone]   [phone] / [phone] APPOINTMENTS: [phone]     Consumer Price Line (CPL) [phone]       ATHLETE'S FOOT (TINEA PEDIS)  It is a rash that is caused by a fungus (most commonly Dermatophytes) in the outer layer of skin on the foot or in the nails. It can occur between the toes or on the instep and heel areas of the feet. Fungus will grow in warm and moist environments. The fungus that causes athlete's foot is contagious and you can get it from touching someone who has it of walking around bare foot around swimming pools, gyms, saunas, communal baths and showers, fitness centers or locker rooms.     SYMPTOMS  The symptoms of athlete's foot are numerous and include; itching, burning or stinging between the toes or on the soles of the feet, blisters, cracked and peeling skin, excessive dryness or excessive scaling on the bottom or sides of the feet, redness and softness with breaking down of the skin, especially between the toes, foot odor and thick, crumbly nails. Older males or people who live in warm humid environments are more prone to get it but it can develop at any age.    TREATMENT OPTIONS  Typically it can be treated with antifungal creams, lotions, ointments, sprays, or gels.  Many are available over the counter, some are prescription. It is important  that you use them long enough, typically 4 weeks, to clear the rash. If an infection is particularly bad, your provider may prescribe oral medication. It is important that you follow the directions for any medication carefully to prevent recurrence of the rash.    HOME TREATMENT  1.  Keep feet clean and dry  2.  Dry between your toes any time your feet become wet  3.  Wear socks that are made of cotton, wool, or fibers designed to wick moisture away  from skin. Nylon, lycra, and spandex tend to trap moisture.  4.  If you have blistering, you may soak your feet in Burow's solution (aluminum acetate is active ingredient. Domeboro is a brand sold at [employer]). This is available over the counter.  5.  Treat shows with antifungal powder  6.  Tea Tree oil may help reduce symptoms but does not cure the rash.    PREVENTION  1.  Change socks or stockings regularly.  2.  Use talcum powder on your feet if they sweat a lot.  3.  Do not borrow shoes.  4.  Let shoes dry out for 24 hours before wearing them again.  5.  Treat shoes with fungal powder  6.  Wear shoes that are well ventilated such as leather shoes or sandals.  Avoid shoes  made of synthetic materials such as vinyl or rubber.  7.  Wear water proof sandals when you are in public areas such as locker rooms, pools, communal baths, showers, saunas, and fitness centers.    FYI: Call or seek medical attention IMMEDIATELY if:  - You develop a fever over 100.4F for more than 24 hrs.  - There is a discharge of pus from infected areas.  - Red streaks develop from the affected areas  - Increase in redness, warmth, pain, swelling, or tenderness in the affected areas.    HYPERHIDROSIS   Hyperhidrosis is a disorder characterized by excessive sweating and occurs in up to 1 % of the population. This excessive sweating often occurs in the armpits, hands, or feet. Numerous sweat glands are distributed all over the body and the amount of sweat they produce is regulated by the brain via  the sympathetic nervous system. Overactivity of these sympathetic nerves or the specific area of the brain responsible for controlling sweating can result in hyperhidrosis. The exact cause in certain individuals remains unknown (primary hyperhidrosis) while in others it is part of an underlying condition (secondary hyperhidrosis). Generalized hyperhidrosis may be due to medications, cardiovascular disorders, respiratory failure, overactive thyroid, or Parkinson's disease. Localized hyperhidrosis can result from a stroke, spinal or peripheral nerve damage, neuropathy or chronic anxiety disorders.     One of the biggest problems of excessive foot sweating is the odor that results from this condition. This can be very problematic in many social situations and makes pedal hyperhidrosis an important obstacle in living an active social life. Foot infections, athletes foot, plantar warts and fungal toenails can also occur due to the constant humidity which favors the growth of germs and bacteria.   Good foot hygiene including changing footwear regularly and using shoe and stocking materials that breathe may help alleviate some of the symptoms. Antiperspirants, sprays and roll-ons are available for topical treatment. Choose a fragrance-free aluminum salt preparation suitable for hyperhidrosis (10-25% in alcohol or as a gel) such as Drysol or Xerac AC. Remember that deodorants are just fragrances designed to mask unpleasant smells and do not reduce the amount of sweat your body produces. Antiperspirants should be applied when the skin is dry, after a shower and just before bed. Wash it off in the morning. They should be used for consecutive nights and then only once or twice weekly. Bromi-Iotion is also an effective aid in the management of excessive perspiration and odor. Talcum powder and corn starch powder can be useful for mild hyperhidrosis and may be applied between the toes and under skin folds.   Wash your feet with  a mild soap and then pour one to two cups of apple cider vinegar into a small bowl. Soak each of your feet in the mixture for around five minutes and then wash away the vinegar again with mild soap. While you may use this mixture several times throughout the day, be careful not to overdo it and cause your skin to be irritated. If your skin feels irritated after soaking them in apple cider vinegar, stop the soaking immediately.    Systemic medications used to treat hyperhidrosis include anticholinergic medications such ,as Pro Banthine and Cogentin. These have a profound effect on secretion from the sweat duct but also have adverse side effects including dry mouth, blurred vision, urinary retention and constipation.   The current surgical treatment available for hyperhidrosis involves destroying or removing a specific portion of the main sympathetic nerve that causes sweating in a particular region of the body. This should be reserved for hyperhidrosis that is unresponsive to all forms of conservative or less invasive treatment. The adverse effects of a sympathectomy include phantom sweating (the sensation of sweating in the absence of sweat) and compensatory sweating in which the patient becomes hyperhidrotic in other areas of the body that were not previously a problem.      BODY WEIGHT AND YOUR FEET  The following information is included in the after visit summary for all patients. Body weight can be a sensitive issue to discuss in clinic, but we think the following information is very important. Although we focus on the feet and ankles, we do support the overall health of our patients.     Many things can cause foot and ankle problems. Foot structure, activity level, foot mechanics and injuries are common causes of pain. One very important issue that often goes unmentioned, is body weight. Extra weight can cause increased stress on muscles, ligaments, bones and tendons. Sometimes just a few extra pounds is all it  takes to put one over her/his threshold. Without reducing that stress, it can be difficult to alleviate pain. As Foot & Ankle specialists, our job is addressing the lower extremity problem and possible causes. Regarding extra body weight, we encourage patients to discuss diet and weight management plans with their primary care doctors. It is this team approach that gives you the best opportunity for pain relief and getting you back on your feet.      [facility] has a Comprehensive Weight Management Program. This program includes counseling, education, non-surgical and surgical approaches to weight loss. If you are interested in learning more either talk to you primary care provider or call [phone].

## 2019-12-04 NOTE — LETTER
12/4/2019         RE: Kobe Bravo III  [address]  [facility] [address]        Dear Colleague,    Thank you for referring your patient, Kobe Bravo III, to the [facility]. Please see a copy of my visit note below.    PATIENT HISTORY:   Kobe Bravo III is a 44 year old male who presents to clinic for persistent wounds to left foot. Notes they started about 2 months ago. Was seen in urgent care. Put on topical antifungal and oral antibiotic. Notes it helped alittle for a little bit but then came back. It does itch but is cracking and sore as well. Pain is 4/10 but can be 7/10 at its worst. Will swell. Wondering what is causing it and what can be done for it. Denies injury but notes feet sweat a lot.       Review of Systems:  Patient denies fever, chills, rash,  stiffness, limping, numbness, weakness, heart burn, blood in stool, chest pain with activity, calf pain when walking, shortness of breath with activity, chronic cough, easy bleeding/bruising, swelling of ankles, excessive thirst, fatigue, depression, anxiety.  Patient admits to wound.     PAST MEDICAL HISTORY: No past medical history on file.     PAST SURGICAL HISTORY:   Past Surgical History:   Procedure Laterality Date     ARTHROSCOPY KNEE WITH MEDIAL MENISCECTOMY  1/23/2012    Procedure:ARTHROSCOPY KNEE WITH MEDIAL MENISCECTOMY; Left Knee scope with Partial Medial Menisectomy  ; Surgeon:ONOFRE PATRICIA; Location: OR     ORTHOPEDIC SURGERY      marvin ACL repair     ORTHOPEDIC SURGERY      left foot fracture repair     SEPTOPLASTY       wisdom teeth[          MEDICATIONS:   Current Outpatient Medications:      ketoconazole (NIZORAL) 2 % external cream, Apply topically daily, Disp: 60 g, Rfl: 1     NO ACTIVE MEDICATIONS, , Disp: , Rfl:      ALLERGIES:  No Known Allergies     SOCIAL HISTORY:   Social History     Socioeconomic History     Marital status:      Spouse name: Not on file     Number of children: Not on  "file     Years of education: Not on file     Highest education level: Not on file   Occupational History     Not on file   Social Needs     Financial resource strain: Not on file     Food insecurity:     Worry: Not on file     Inability: Not on file     Transportation needs:     Medical: Not on file     Non-medical: Not on file   Tobacco Use     Smoking status: Never Smoker     Smokeless tobacco: Former User   Substance and Sexual Activity     Alcohol use: Yes     Alcohol/week: 0.0 standard drinks     Comment: on occ     Drug use: No     Sexual activity: Yes     Partners: Female   Lifestyle     Physical activity:     Days per week: Not on file     Minutes per session: Not on file     Stress: Not on file   Relationships     Social connections:     Talks on phone: Not on file     Gets together: Not on file     Attends Yarsanism service: Not on file     Active member of club or organization: Not on file     Attends meetings of clubs or organizations: Not on file     Relationship status: Not on file     Intimate partner violence:     Fear of current or ex partner: Not on file     Emotionally abused: Not on file     Physically abused: Not on file     Forced sexual activity: Not on file   Other Topics Concern     Parent/sibling w/ CABG, MI or angioplasty before 65F 55M? Not Asked   Social History Narrative     Not on file        FAMILY HISTORY:   Family History   Problem Relation Age of Onset     Diabetes Father          complications of DM age 69     C.A.D. Father      Asthma Mother      Heart Disease Mother         A-fib     Alcohol/Drug Paternal Grandmother         EXAM:Vitals: /74   Ht 1.74 m (5' 8.5\")   Wt 102.1 kg (225 lb)   BMI 33.71 kg/m     BMI= Body mass index is 33.71 kg/m .    General appearance: Patient is alert and fully cooperative with history & exam.  No sign of distress is noted during the visit.     Psychiatric: Affect is pleasant & appropriate.  Patient appears motivated to improve " health.     Respiratory: Breathing is regular & unlabored while sitting.     HEENT: Hearing is intact to spoken word.  Speech is clear.  No gross evidence of visual impairment that would impact ambulation.     Dermatologic: red, cracking, plaques to the doral left 1st, 2nd, 3rd, and 5th toes. Some maceration noted inbetween toes. Serous drainage minimally.      Vascular: DP & PT pulses are intact & regular bilaterally.  No significant edema or varicosities noted.  CFT and skin temperature is normal to both lower extremities.     Neurologic: Lower extremity sensation is intact to light touch.  No evidence of weakness or contracture in the lower extremities.  No evidence of neuropathy.     Musculoskeletal: Patient is ambulatory without assistive device or brace.  No gross ankle deformity noted.  No foot or ankle joint effusion is noted.    ASSESSMENT:    Hyperhidrosis of feet  Left foot pain  Tinea pedis of left foot     PLAN:  Reviewed patient's chart in epic. Talked about athletes foot vs dermatitis. Recommend iodine at this time 1-2 x a day with a gauze roll around foot for next 2 weeks. Will also try oral antifunga. If not improved in 2 weeks, recommend biopsy of the foot.     Talked about hyperhidrosis. Recommend changing socks 2x a day. Possible prescription powder if dermatitis calms down.        Ofelia Patel DPM, Podiatry/Foot and Ankle Surgery    Weight management plan: Patient was referred to their PCP to discuss a diet and exercise plan.    Recommended to Kobe Bravo III to follow up with Primary Care provider regarding elevated blood pressure.        Again, thank you for allowing me to participate in the care of your patient.        Sincerely,        Ofelia Patel DPM, Podiatry/Foot and Ankle Surgery

## 2019-12-04 NOTE — TELEPHONE ENCOUNTER
itraconazole (SPORANOX) 100 MG capsule 10 capsule 0 12/4/2019 12/14/2019 --   Sig - Route: Take 1 capsule (100 mg) by mouth daily for 10 days - Oral   Sent to pharmacy as: itraconazole (SPORANOX) 100 MG capsule   Class: E-Prescribe   Order: [id number]   E-Prescribing Status: Receipt confirmed by pharmacy (12/4/2019  9:10 AM CST)       Coverage information:     Subscriber: [id number] EVERARDO VEGA III     Rel to sub: 01 - Self     Member ID: [id number]     Payor: 4-BLUE PLUS Ph: [phone]     Benefit plan: [id number]-BLUE PLUS [place] ADVANTAGE Ph: [phone]     Group number: [id number]     Member effective dates: from 12/27/18        [facility][id number] - [place], [place] - [address]          King Rose on 12/4/2019 at 4:27 PM

## 2019-12-05 ENCOUNTER — TELEPHONE (OUTPATIENT)
Dept: PODIATRY | Facility: CLINIC | Age: 44
End: 2019-12-05

## 2019-12-05 NOTE — TELEPHONE ENCOUNTER
Central Prior Authorization Team   Phone: [phone]    PA Initiation    Medication: itraconazole-Initiated  Insurance Company: [employer] - Phone [phone] Fax [phone]  Pharmacy Filling the Rx: [facility][id number] - [place], [place] - [address] [place] [address]  Filling Pharmacy Phone: [phone]  Filling Pharmacy Fax:    Start Date: 12/5/2019

## 2019-12-05 NOTE — TELEPHONE ENCOUNTER
Central Prior Authorization Team   Phone: [phone]      PA Initiation    Medication: itraconazole (SPORANOX) 100 MG capsule-2nd insurance-Initiated  Insurance Company: [employer] - Phone [phone] Fax [phone]  Pharmacy Filling the Rx: [id number] - [place], [place] - [address] [facility]  Filling Pharmacy Phone: [phone]  Filling Pharmacy Fax:    Start Date: 12/5/2019

## 2019-12-05 NOTE — TELEPHONE ENCOUNTER
Prior Authorization Approval    Authorization Effective Date: 11/5/2019  Authorization Expiration Date: 12/15/2019  Medication: itraconazole-APPROVED  Approved Dose/Quantity:   Reference #:     Insurance Company: [employer] - Phone [phone] Fax [phone]  Expected CoPay:       CoPay Card Available:      Foundation Assistance Needed:    Which Pharmacy is filling the prescription (Not needed for infusion/clinic administered): [facility][id number] - [place], [place] - [address]  Pharmacy Notified: Yes  Patient Notified: No    Pharmacy will notify patient when medication is ready.

## 2019-12-06 NOTE — TELEPHONE ENCOUNTER
Prior Authorization Approval    Authorization Effective Date: 12/5/2019  Authorization Expiration Date: 3/5/2020  Medication: itraconazole (SPORANOX) 100 MG capsule-2nd insurance-APPROVED  Approved Dose/Quantity:   Reference #:     Insurance Company: [employer] - Apliiq [phone] Fax [phone]  Expected CoPay:       CoPay Card Available:      Foundation Assistance Needed:    Which Pharmacy is filling the prescription (Not needed for infusion/clinic administered): [facility][id number] - [place], [place] - [address]  Pharmacy Notified: Yes  Patient Notified: No    Pharmacy will notify patient when medication is ready.

## 2019-12-27 ENCOUNTER — OFFICE VISIT (OUTPATIENT)
Dept: PODIATRY | Facility: CLINIC | Age: 44
End: 2019-12-27
Payer: COMMERCIAL

## 2019-12-27 ENCOUNTER — HOSPITAL ENCOUNTER (OUTPATIENT)
Dept: LAB | Facility: CLINIC | Age: 44
Discharge: HOME | End: 2019-12-27
Attending: PODIATRIST | Admitting: PODIATRIST
Payer: COMMERCIAL

## 2019-12-27 VITALS
WEIGHT: 227 LBS | SYSTOLIC BLOOD PRESSURE: 124 MMHG | DIASTOLIC BLOOD PRESSURE: 84 MMHG | BODY MASS INDEX: 33.62 KG/M2 | HEIGHT: 69 IN

## 2019-12-27 DIAGNOSIS — M79.672 LEFT FOOT PAIN: ICD-10-CM

## 2019-12-27 DIAGNOSIS — L30.9 DERMATITIS OF LEFT FOOT: Primary | ICD-10-CM

## 2019-12-27 DIAGNOSIS — L30.9 DERMATITIS OF LEFT FOOT: ICD-10-CM

## 2019-12-27 LAB
GRAM STN SPEC: ABNORMAL
GRAM STN SPEC: ABNORMAL
Lab: ABNORMAL
SPECIMEN SOURCE: ABNORMAL

## 2019-12-27 PROCEDURE — 87070 CULTURE OTHR SPECIMN AEROBIC: CPT | Performed by: PODIATRIST

## 2019-12-27 PROCEDURE — 87205 SMEAR GRAM STAIN: CPT | Performed by: PODIATRIST

## 2019-12-27 PROCEDURE — 99213 OFFICE O/P EST LOW 20 MIN: CPT | Mod: 25 | Performed by: PODIATRIST

## 2019-12-27 PROCEDURE — 87077 CULTURE AEROBIC IDENTIFY: CPT | Performed by: PODIATRIST

## 2019-12-27 PROCEDURE — 87186 SC STD MICRODIL/AGAR DIL: CPT | Performed by: PODIATRIST

## 2019-12-27 PROCEDURE — 11106 INCAL BX SKN SINGLE LES: CPT | Performed by: PODIATRIST

## 2019-12-27 RX ORDER — PREDNISONE 10 MG/1
10 TABLET ORAL DAILY
Qty: 10 TABLET | Refills: 0 | Status: SHIPPED | OUTPATIENT
Start: 2019-12-27 | End: 2020-01-06

## 2019-12-27 ASSESSMENT — MIFFLIN-ST. JEOR: SCORE: 1912.11

## 2019-12-27 NOTE — PATIENT INSTRUCTIONS
Thank you for choosing [facility] Podiatry / Foot & Ankle Surgery!    DR. DE LA TORRE'S CLINIC SCHEDULE  MONDAY AM - [place] TUESDAY - [place]   [address] [address] [address] [address] [place] [address]   [phone] / [phone] / [phone]       WEDNESDAY - [place] FRIDAY AM - WOUND CENTER   [address]   [place] [address] [address] [address]   [phone] / [phone]       FRIDAY PM - [place] SCHEDULE SURGERY: [phone]   [address] BILLING QUESTIONS: [phone]   [place] [address] AFTER HOURS: [phone]   [phone] / [phone] APPOINTMENTS: [phone]     Consumer Price Line (CPL) [phone]     FOLLOW UP: we will call you with biopsy results        BODY WEIGHT AND YOUR FEET  The following information is included in the after visit summary for all patients. Body weight can be a sensitive issue to discuss in clinic, but we think the following information is very important. Although we focus on the feet and ankles, we do support the overall health of our patients.     Many things can cause foot and ankle problems. Foot structure, activity level, foot mechanics and injuries are common causes of pain. One very important issue that often goes unmentioned, is body weight. Extra weight can cause increased stress on muscles, ligaments, bones and tendons. Sometimes just a few extra pounds is all it takes to put one over her/his threshold. Without reducing that stress, it can be difficult to alleviate pain. As Foot & Ankle specialists, our job is addressing the lower extremity problem and possible causes. Regarding extra body weight, we encourage patients to discuss diet and weight management plans with their primary care doctors. It is this team approach that gives you the best opportunity for pain relief and getting you back on your feet.      [facility] has a Comprehensive Weight  Management Program. This program includes counseling, education, non-surgical and surgical approaches to weight loss. If you are interested in learning more either talk to you primary care provider or call [phone].

## 2019-12-27 NOTE — LETTER
"    12/27/2019         RE: Kobe Bravo III  [address]  [place] [address]        Dear Colleague,    Thank you for referring your patient, Kobe Bravo III, to the [facility] PODIATRY. Please see a copy of my visit note below.    Podiatry / Foot and Ankle Surgery Progress Note    December 27, 2019    Subject: Patient was seen for follow up on continued redness and weeping to the left foot. Does not thing the antifungals helped. Pain is 6/10. Has been using the iodine. Denies fever, chills, nausa. Wondering what else can be done.     Objective:  Vitals: /84   Ht 1.74 m (5' 8.5\")   Wt 103 kg (227 lb)   BMI 34.01 kg/m     BMI= Body mass index is 34.01 kg/m .    General:  Patient is alert and orientated.  NAD    Dermatologic: red, cracking, plaques to the doral left 1st, 2nd, 3rd, and 5th toes. Some maceration noted inbetween toes. Serous drainage minimally.      Vascular: DP & PT pulses are intact & regular bilaterally.  No significant edema or varicosities noted.  CFT and skin temperature is normal to both lower extremities.     Neurologic: Lower extremity sensation is intact to light touch.  No evidence of weakness or contracture in the lower extremities.  No evidence of neuropathy.     Musculoskeletal: Patient is ambulatory without assistive device or brace.  No gross ankle deformity noted.  No foot or ankle joint effusion is noted.    Cultures:  pending    Assessment:    Dermatitis of left foot  Left foot pain    Plan:  At this time, recommend biospy and culturing the wounds. See procedure note below. Will order prednisone to see if this can calm down the redness and inflammation to the foot. Given the weeping, hesitate to put steroid cream on area and make it more macerated. Will call him with the results.     Procedure:  After verbal consent, the right great toe was anesthetized with 1% lidocaine plain, 5cc. The foot wounds were swabbed and sent for culture.   The foot was " prepped and draped using sterile technique. A #15 blade was use to incise the skin in elliptical excision measure 0.4cm x 0.2cm x 0.2cm for the skin biopsy down to and including subcutaneous tissue. This was placed into formalin and sent to pathology. 4-0 prolene was used to place a stitch to help control bleeding. Dry dressing was placed over incision. Patient tolerated procedure and anesthesia well.        Ofelia Patel DPM, Podiatry/Foot and Ankle Surgery    Weight management plan: Patient was referred to their PCP to discuss a diet and exercise plan.    Recommended to Kobe Bravo III to follow up with Primary Care provider regarding elevated blood pressure.      Again, thank you for allowing me to participate in the care of your patient.        Sincerely,        Ofelia Patel DPM, Podiatry/Foot and Ankle Surgery

## 2019-12-27 NOTE — PROGRESS NOTES
"Podiatry / Foot and Ankle Surgery Progress Note    December 27, 2019    Subject: Patient was seen for follow up on continued redness and weeping to the left foot. Does not thing the antifungals helped. Pain is 6/10. Has been using the iodine. Denies fever, chills, nausa. Wondering what else can be done.     Objective:  Vitals: /84   Ht 1.74 m (5' 8.5\")   Wt 103 kg (227 lb)   BMI 34.01 kg/m    BMI= Body mass index is 34.01 kg/m .    General:  Patient is alert and orientated.  NAD    Dermatologic: red, cracking, plaques to the doral left 1st, 2nd, 3rd, and 5th toes. Some maceration noted inbetween toes. Serous drainage minimally.      Vascular: DP & PT pulses are intact & regular bilaterally.  No significant edema or varicosities noted.  CFT and skin temperature is normal to both lower extremities.     Neurologic: Lower extremity sensation is intact to light touch.  No evidence of weakness or contracture in the lower extremities.  No evidence of neuropathy.     Musculoskeletal: Patient is ambulatory without assistive device or brace.  No gross ankle deformity noted.  No foot or ankle joint effusion is noted.    Cultures:  pending    Assessment:    Dermatitis of left foot  Left foot pain    Plan:  At this time, recommend biospy and culturing the wounds. See procedure note below. Will order prednisone to see if this can calm down the redness and inflammation to the foot. Given the weeping, hesitate to put steroid cream on area and make it more macerated. Will call him with the results.     Procedure:  After verbal consent, the right great toe was anesthetized with 1% lidocaine plain, 5cc. The foot wounds were swabbed and sent for culture.   The foot was prepped and draped using sterile technique. A #15 blade was use to incise the skin in elliptical excision measure 0.4cm x 0.2cm x 0.2cm for the skin biopsy down to and including subcutaneous tissue. This was placed into formalin and sent to pathology. 4-0 " prolene was used to place a stitch to help control bleeding. Dry dressing was placed over incision. Patient tolerated procedure and anesthesia well.        Ofelia Patel DPM, Podiatry/Foot and Ankle Surgery    Weight management plan: Patient was referred to their PCP to discuss a diet and exercise plan.    Recommended to Kobe Bravo III to follow up with Primary Care provider regarding elevated blood pressure.

## 2019-12-30 ENCOUNTER — TELEPHONE (OUTPATIENT)
Dept: PODIATRY | Facility: CLINIC | Age: 44
End: 2019-12-30

## 2019-12-30 DIAGNOSIS — L30.9 DERMATITIS OF LEFT FOOT: Primary | ICD-10-CM

## 2019-12-30 DIAGNOSIS — L03.116 CELLULITIS OF LEFT FOOT: Primary | ICD-10-CM

## 2019-12-30 LAB
BACTERIA SPEC CULT: ABNORMAL
BACTERIA SPEC CULT: ABNORMAL
Lab: ABNORMAL
SPECIMEN SOURCE: ABNORMAL

## 2019-12-30 PROCEDURE — 88305 TISSUE EXAM BY PATHOLOGIST: CPT | Mod: TC | Performed by: PODIATRIST

## 2019-12-30 PROCEDURE — 88312 SPECIAL STAINS GROUP 1: CPT | Mod: TC | Performed by: PODIATRIST

## 2019-12-30 RX ORDER — SULFAMETHOXAZOLE/TRIMETHOPRIM 800-160 MG
1 TABLET ORAL 2 TIMES DAILY
Qty: 14 TABLET | Refills: 0 | Status: SHIPPED | OUTPATIENT
Start: 2019-12-30 | End: 2020-08-10

## 2019-12-30 NOTE — TELEPHONE ENCOUNTER
If it does not seem like it is helping, he can stop prednisone.    Please let patient know.     Ofelia Patel DPM

## 2019-12-30 NOTE — TELEPHONE ENCOUNTER
Cultures are growing back staph that is resistant to some antibiotics.     Recommend that we put you on a new antibiotic. Bactrim.     Will send order to pharmacy. ([facility] in [place])    Please let patient know.      Ofelia Patel DPM

## 2019-12-30 NOTE — TELEPHONE ENCOUNTER
"New order placed. Please let them know.     SANDRA Anderson Tina L, AGA Patel, Ofelia YOUNG DPM, Podiatry/Foot and Ankle Surgery             Dr. Patel,     I talked with Lissa in the lab and she had not heard back from IT. She thinks it would be best if you could place a new order with the same date and time of 12/27/19 4:31pm and for source location list \"skin from left foot\".     Thanks,     Pat        "

## 2019-12-30 NOTE — TELEPHONE ENCOUNTER
"Spoke with Lissa.  She is not sure what is going on with the order. Is going to call IT.     SANDRA Anderson Tina L, RN  Amanda, Ofelia YOUNG DPM, Podiatry/Foot and Ankle Surgery             Dr. Patel,     I received a call from [facility] this am. She needs an order corrected for this patient. They received the tissue specimen but it was not marked at \"collected\" in Epic. It needs to be entered as collected with the correct date and time of \"12/27/19 4:31pm\".     It also did not have a source listed of left foot and needs that added as well. (I'm not understanding that part since the diagnosis says left foot.) She said if it doesn't let you add the source, then the order needs to be re-entered as a new order.   They would like it corrected this morning if able please.     If you have questions, she can be reached at [phone].     Thanks,     Pat"

## 2019-12-30 NOTE — TELEPHONE ENCOUNTER
Phone call to [facility]. She states she could see it but when it was released it disappeared. They have another system it is supposed to release to. She has a ticket out to IT again. She will call back if it needs to be re-done again.     FYI to tiburcio.     HOWIE Guillen RN

## 2019-12-30 NOTE — TELEPHONE ENCOUNTER
Spoke to patient on the phone, advised of culture results and that abx had been sent to [facility] for patient to . Patient inquiring as to if he should continue taking the prednisone or if he should discontinue. Routing to Dr Patel;  OK to [facility] with this information.            King Rose on 12/30/2019 at 9:05 AM

## 2019-12-31 NOTE — TELEPHONE ENCOUNTER
Lissa from [facility] called back to let Dr Patel know that the order went though is currently processing. She expects results to be in by Thursday.     Closing encounter. No further action required.

## 2020-01-03 LAB — COPATH REPORT: NORMAL

## 2020-08-06 ENCOUNTER — E-VISIT (OUTPATIENT)
Dept: FAMILY MEDICINE | Facility: CLINIC | Age: 45
End: 2020-08-06
Payer: COMMERCIAL

## 2020-08-06 DIAGNOSIS — R45.86 MOOD CHANGE: Primary | ICD-10-CM

## 2020-08-06 PROCEDURE — 99207 ZZC NO BILLABLE SERVICE THIS VISIT: CPT | Performed by: PHYSICIAN ASSISTANT

## 2020-08-06 ASSESSMENT — ANXIETY QUESTIONNAIRES
3. WORRYING TOO MUCH ABOUT DIFFERENT THINGS: SEVERAL DAYS
2. NOT BEING ABLE TO STOP OR CONTROL WORRYING: SEVERAL DAYS
1. FEELING NERVOUS, ANXIOUS, OR ON EDGE: SEVERAL DAYS
7. FEELING AFRAID AS IF SOMETHING AWFUL MIGHT HAPPEN: NOT AT ALL
6. BECOMING EASILY ANNOYED OR IRRITABLE: MORE THAN HALF THE DAYS
7. FEELING AFRAID AS IF SOMETHING AWFUL MIGHT HAPPEN: NOT AT ALL
GAD7 TOTAL SCORE: 8
5. BEING SO RESTLESS THAT IT IS HARD TO SIT STILL: SEVERAL DAYS
GAD7 TOTAL SCORE: 8
4. TROUBLE RELAXING: MORE THAN HALF THE DAYS

## 2020-08-07 ASSESSMENT — ANXIETY QUESTIONNAIRES: GAD7 TOTAL SCORE: 8

## 2020-08-07 NOTE — PROGRESS NOTES
"Kobe Bravo III is a 45 year old male who is being evaluated via a billable telephone visit.      The patient has been notified of following:     \"This telephone visit will be conducted via a call between you and your physician/provider. We have found that certain health care needs can be provided without the need for a physical exam.  This service lets us provide the care you need with a short phone conversation.  If a prescription is necessary we can send it directly to your pharmacy.  If lab work is needed we can place an order for that and you can then stop by our lab to have the test done at a later time.    Telephone visits are billed at different rates depending on your insurance coverage. During this emergency period, for some insurers they may be billed the same as an in-person visit.  Please reach out to your insurance provider with any questions.    If during the course of the call the physician/provider feels a telephone visit is not appropriate, you will not be charged for this service.\"    Patient has given verbal consent for Telephone visit?  {YES-NO  Default Yes:4444::\"Yes\"}    What phone number would you like to be contacted at? ***    How would you like to obtain your AVS? {AVS Preference:214000}    Subjective     Kobe Bravo III is a 45 year old male who presents via phone visit today for the following health issues:    HPI    Depression and Anxiety Follow-Up    How are you doing with your depression since your last visit? { :032318::\"No change\"}    How are you doing with your anxiety since your last visit?  { :525367::\"No change\"}    Are you having other symptoms that might be associated with depression or anxiety? { :590907}    Have you had a significant life event? { :982457}     Do you have any concerns with your use of alcohol or other drugs? { :201208}    Social History     Tobacco Use     Smoking status: Never Smoker     Smokeless tobacco: Former User   Substance Use Topics     " "Alcohol use: Yes     Alcohol/week: 0.0 standard drinks     Comment: on occ     Drug use: No     No flowsheet data found.  FARHAD-7 SCORE 8/6/2020   Total Score 8 (mild anxiety)   Total Score 8     {Last PHQ9 or GAD7 Responses (Optional):776782}    Suicide Assessment Five-step Evaluation and Treatment (SAFE-T)      How many servings of fruits and vegetables do you eat daily?  { :183981}    On average, how many sweetened beverages do you drink each day (Examples: soda, juice, sweet tea, etc.  Do NOT count diet or artificially sweetened beverages)?   { 1-11:344438}    How many days per week do you exercise enough to make your heart beat faster? { :869698}    How many minutes a day do you exercise enough to make your heart beat faster? { :669668}    How many days per week do you miss taking your medication? {0-7 :341635}      {additonal problems for provider to add (Optional):822616}    {HIST REVIEW/ LINKS 2 (Optional):787738}    Reviewed and updated as needed this visit by Provider         Review of Systems   {ROS COMP (Optional):876487}       Objective   Reported vitals:  There were no vitals taken for this visit.   {GENERAL APPEARANCE:50::\"healthy\",\"alert\",\"no distress\"}  PSYCH: Alert and oriented times 3; coherent speech, normal   rate and volume, able to articulate logical thoughts, able   to abstract reason, no tangential thoughts, no hallucinations   or delusions  His affect is { :2694944::\"normal\"}  RESP: No cough, no audible wheezing, able to talk in full sentences  Remainder of exam unable to be completed due to telephone visits    {Diagnostic Test Results (Optional):790755::\"Diagnostic Test Results:\",\"Labs reviewed in Epic\"}        Assessment/Plan:    {Diagnosis, Associated Orders and Comment:182473}    No follow-ups on file.      Phone call duration:  *** minutes    {signature options:557466}      "

## 2020-08-07 NOTE — TELEPHONE ENCOUNTER
I would prefer to do a virtual visit in this place. Please call and help schedule him next week sometime.

## 2020-08-10 ENCOUNTER — VIRTUAL VISIT (OUTPATIENT)
Dept: FAMILY MEDICINE | Facility: CLINIC | Age: 45
End: 2020-08-10
Payer: COMMERCIAL

## 2020-08-10 DIAGNOSIS — F43.22 ADJUSTMENT DISORDER WITH ANXIOUS MOOD: Primary | ICD-10-CM

## 2020-08-10 PROCEDURE — 99213 OFFICE O/P EST LOW 20 MIN: CPT | Mod: 95 | Performed by: PHYSICIAN ASSISTANT

## 2020-08-10 ASSESSMENT — PATIENT HEALTH QUESTIONNAIRE - PHQ9
SUM OF ALL RESPONSES TO PHQ QUESTIONS 1-9: 5
5. POOR APPETITE OR OVEREATING: SEVERAL DAYS

## 2020-08-10 ASSESSMENT — ANXIETY QUESTIONNAIRES
2. NOT BEING ABLE TO STOP OR CONTROL WORRYING: SEVERAL DAYS
1. FEELING NERVOUS, ANXIOUS, OR ON EDGE: SEVERAL DAYS
6. BECOMING EASILY ANNOYED OR IRRITABLE: SEVERAL DAYS
7. FEELING AFRAID AS IF SOMETHING AWFUL MIGHT HAPPEN: SEVERAL DAYS
3. WORRYING TOO MUCH ABOUT DIFFERENT THINGS: MORE THAN HALF THE DAYS
GAD7 TOTAL SCORE: 7
5. BEING SO RESTLESS THAT IT IS HARD TO SIT STILL: NOT AT ALL
IF YOU CHECKED OFF ANY PROBLEMS ON THIS QUESTIONNAIRE, HOW DIFFICULT HAVE THESE PROBLEMS MADE IT FOR YOU TO DO YOUR WORK, TAKE CARE OF THINGS AT HOME, OR GET ALONG WITH OTHER PEOPLE: SOMEWHAT DIFFICULT

## 2020-08-10 NOTE — PROGRESS NOTES
"Kobe Bravo III is a 45 year old male who is being evaluated via a billable telephone visit.      The patient has been notified of following:     \"This telephone visit will be conducted via a call between you and your physician/provider. We have found that certain health care needs can be provided without the need for a physical exam.  This service lets us provide the care you need with a short phone conversation.  If a prescription is necessary we can send it directly to your pharmacy.  If lab work is needed we can place an order for that and you can then stop by our lab to have the test done at a later time.    Telephone visits are billed at different rates depending on your insurance coverage. During this emergency period, for some insurers they may be billed the same as an in-person visit.  Please reach out to your insurance provider with any questions.    If during the course of the call the physician/provider feels a telephone visit is not appropriate, you will not be charged for this service.\"    Patient has given verbal consent for Telephone visit?  Yes    What phone number would you like to be contacted at? [phone]    How would you like to obtain your AVS? MyChart    Subjective     Kobe Bravo III is a 45 year old male who presents via phone visit today for the following health issues:    HPI    Abnormal Mood Symptoms      Duration: 3-4 months     Description:  Depression: no   Anxiety: YES, restlessness, insomnia, mind racing   Panic attacks: no      Accompanying signs and symptoms: see PHQ-9 and FARHAD scores    History (similar episodes/previous evaluation): None    Precipitating or alleviating factors: None    Therapies tried and outcome: none    Kobe Bravo III is a 45 year old male who presents today for telephone visit regarding recent mood changes  He mentions over the past few months some work and life changes have built up and added a lot of stress  Increased responsibilities at home; " work  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  Worried about leaning towards etoh as mechanism of relaxation   -not during the week usually  Wife has noted but not overly concerned  Does think some anxiety/depression in the family; less talked about  Has done some counseling 3-4 years ago      Patient Active Problem List   Diagnosis     CARDIOVASCULAR SCREENING; LDL GOAL LESS THAN 160     ANGELINE (obstructive sleep apnea)     Past Surgical History:   Procedure Laterality Date     ARTHROSCOPY KNEE WITH MEDIAL MENISCECTOMY  2012    Procedure:ARTHROSCOPY KNEE WITH MEDIAL MENISCECTOMY; Left Knee scope with Partial Medial Menisectomy  ; Surgeon:ONOFRE PATRICIA; Location:[facility]     ORTHOPEDIC SURGERY      marvin ACL repair     ORTHOPEDIC SURGERY      left foot fracture repair     SEPTOPLASTY       wisdom teeth[         Social History     Tobacco Use     Smoking status: Never Smoker     Smokeless tobacco: Former User   Substance Use Topics     Alcohol use: Yes     Alcohol/week: 0.0 standard drinks     Comment: on occ     Family History   Problem Relation Age of Onset     Diabetes Father          complications of DM age 69     C.A.D. Father      Asthma Mother      Heart Disease Mother         A-fib     Alcohol/Drug Paternal Grandmother            Reviewed and updated as needed this visit by Provider         Review of Systems   Constitutional, HEENT, cardiovascular, pulmonary, gi and gu systems are negative, except as otherwise noted.       Objective   Reported vitals:  There were no vitals taken for this visit.   healthy, alert and no distress  PSYCH: Alert and oriented times 3; coherent speech, normal   rate and volume, able to articulate logical thoughts, able   to abstract reason, no tangential thoughts, no hallucinations   or delusions  His affect is normal  RESP: No cough, no audible wheezing, able to talk in full sentences  Remainder of exam unable to be completed due to telephone  visits    Diagnostic Test Results:  none         Assessment/Plan:    1. Adjustment disorder with anxious mood  Increased symptoms during the public health crisis. Reviewed options to consider and I recommend initially starting with therapy. If after 1-2 months he is not seeing full benefit we will consider ssri. Monitor ETOH use.  - MENTAL HEALTH REFERRAL  - Adult; Outpatient Treatment; Behavioral Health Home; Other: [facility] [phone]; We will contact you to schedule the appointment or please call with any questions    Return in about 4 weeks (around 9/7/2020) for Check in - if needed..      Phone call duration:  15 minutes    Sukumar Lucero PA-C

## 2020-08-11 ENCOUNTER — TELEPHONE (OUTPATIENT)
Dept: FAMILY MEDICINE | Facility: CLINIC | Age: 45
End: 2020-08-11

## 2020-08-11 ASSESSMENT — ANXIETY QUESTIONNAIRES: GAD7 TOTAL SCORE: 7

## 2020-08-11 NOTE — TELEPHONE ENCOUNTER
We did not reach Kobe Bravo III, we left a message with our contact number [phone].  If we do not hear from them within the next 3 business days we will mail a letter offering our scheduling services.    If they do call to schedule, in the future, we will inform you of the outcome.    Thank you for your referral,  [facility]

## 2020-09-16 ENCOUNTER — TRANSFERRED RECORDS (OUTPATIENT)
Dept: HEALTH INFORMATION MANAGEMENT | Facility: CLINIC | Age: 45
End: 2020-09-16

## 2020-09-23 ENCOUNTER — TRANSFERRED RECORDS (OUTPATIENT)
Dept: HEALTH INFORMATION MANAGEMENT | Facility: CLINIC | Age: 45
End: 2020-09-23

## 2020-10-05 ENCOUNTER — TRANSFERRED RECORDS (OUTPATIENT)
Dept: HEALTH INFORMATION MANAGEMENT | Facility: CLINIC | Age: 45
End: 2020-10-05

## 2020-11-07 ENCOUNTER — HEALTH MAINTENANCE LETTER (OUTPATIENT)
Age: 45
End: 2020-11-07

## 2020-12-22 ENCOUNTER — TRANSFERRED RECORDS (OUTPATIENT)
Dept: HEALTH INFORMATION MANAGEMENT | Facility: CLINIC | Age: 45
End: 2020-12-22

## 2021-01-05 ENCOUNTER — TRANSFERRED RECORDS (OUTPATIENT)
Dept: HEALTH INFORMATION MANAGEMENT | Facility: CLINIC | Age: 46
End: 2021-01-05

## 2021-02-09 ENCOUNTER — TRANSFERRED RECORDS (OUTPATIENT)
Dept: HEALTH INFORMATION MANAGEMENT | Facility: CLINIC | Age: 46
End: 2021-02-09

## 2021-03-18 ASSESSMENT — ENCOUNTER SYMPTOMS
SORE THROAT: 0
CHILLS: 0
COUGH: 0
DIZZINESS: 0
DIARRHEA: 0
DYSURIA: 0
EYE PAIN: 0
PARESTHESIAS: 0
FREQUENCY: 0
WEAKNESS: 0
PALPITATIONS: 0
FEVER: 0
CONSTIPATION: 0
NERVOUS/ANXIOUS: 0
HEMATOCHEZIA: 0
HEMATURIA: 0
ABDOMINAL PAIN: 0
JOINT SWELLING: 0
NAUSEA: 0
HEARTBURN: 0
HEADACHES: 0
MYALGIAS: 0
SHORTNESS OF BREATH: 0

## 2021-03-19 ENCOUNTER — OFFICE VISIT (OUTPATIENT)
Dept: FAMILY MEDICINE | Facility: CLINIC | Age: 46
End: 2021-03-19
Payer: COMMERCIAL

## 2021-03-19 VITALS
BODY MASS INDEX: 36.89 KG/M2 | WEIGHT: 249.1 LBS | RESPIRATION RATE: 16 BRPM | DIASTOLIC BLOOD PRESSURE: 86 MMHG | TEMPERATURE: 98.8 F | HEART RATE: 103 BPM | SYSTOLIC BLOOD PRESSURE: 130 MMHG | HEIGHT: 69 IN | OXYGEN SATURATION: 96 %

## 2021-03-19 DIAGNOSIS — G47.33 OSA (OBSTRUCTIVE SLEEP APNEA): ICD-10-CM

## 2021-03-19 DIAGNOSIS — Z11.4 SCREENING FOR HIV (HUMAN IMMUNODEFICIENCY VIRUS): ICD-10-CM

## 2021-03-19 DIAGNOSIS — Z11.59 NEED FOR HEPATITIS C SCREENING TEST: ICD-10-CM

## 2021-03-19 DIAGNOSIS — Z00.00 ROUTINE GENERAL MEDICAL EXAMINATION AT A HEALTH CARE FACILITY: Primary | ICD-10-CM

## 2021-03-19 DIAGNOSIS — E66.01 MORBID OBESITY (H): ICD-10-CM

## 2021-03-19 LAB
ALBUMIN SERPL-MCNC: 4 G/DL (ref 3.4–5)
ALP SERPL-CCNC: 63 U/L (ref 40–150)
ALT SERPL W P-5'-P-CCNC: 64 U/L (ref 0–70)
ANION GAP SERPL CALCULATED.3IONS-SCNC: 3 MMOL/L (ref 3–14)
AST SERPL W P-5'-P-CCNC: 28 U/L (ref 0–45)
BILIRUB SERPL-MCNC: 0.7 MG/DL (ref 0.2–1.3)
BUN SERPL-MCNC: 17 MG/DL (ref 7–30)
CALCIUM SERPL-MCNC: 9.7 MG/DL (ref 8.5–10.1)
CHLORIDE SERPL-SCNC: 108 MMOL/L (ref 94–109)
CHOLEST SERPL-MCNC: 221 MG/DL
CO2 SERPL-SCNC: 28 MMOL/L (ref 20–32)
CREAT SERPL-MCNC: 1.02 MG/DL (ref 0.66–1.25)
GFR SERPL CREATININE-BSD FRML MDRD: 89 ML/MIN/{1.73_M2}
GLUCOSE SERPL-MCNC: 100 MG/DL (ref 70–99)
HCV AB SERPL QL IA: NONREACTIVE
HDLC SERPL-MCNC: 67 MG/DL
HIV 1+2 AB+HIV1 P24 AG SERPL QL IA: NONREACTIVE
LDLC SERPL CALC-MCNC: 126 MG/DL
NONHDLC SERPL-MCNC: 154 MG/DL
POTASSIUM SERPL-SCNC: 4 MMOL/L (ref 3.4–5.3)
PROT SERPL-MCNC: 7.5 G/DL (ref 6.8–8.8)
SODIUM SERPL-SCNC: 139 MMOL/L (ref 133–144)
TRIGL SERPL-MCNC: 139 MG/DL
TSH SERPL DL<=0.005 MIU/L-ACNC: 2.4 MU/L (ref 0.4–4)

## 2021-03-19 PROCEDURE — 87389 HIV-1 AG W/HIV-1&-2 AB AG IA: CPT | Performed by: PHYSICIAN ASSISTANT

## 2021-03-19 PROCEDURE — 80053 COMPREHEN METABOLIC PANEL: CPT | Performed by: PHYSICIAN ASSISTANT

## 2021-03-19 PROCEDURE — 86803 HEPATITIS C AB TEST: CPT | Performed by: PHYSICIAN ASSISTANT

## 2021-03-19 PROCEDURE — 80061 LIPID PANEL: CPT | Performed by: PHYSICIAN ASSISTANT

## 2021-03-19 PROCEDURE — 36415 COLL VENOUS BLD VENIPUNCTURE: CPT | Performed by: PHYSICIAN ASSISTANT

## 2021-03-19 PROCEDURE — 99396 PREV VISIT EST AGE 40-64: CPT | Performed by: PHYSICIAN ASSISTANT

## 2021-03-19 PROCEDURE — 84443 ASSAY THYROID STIM HORMONE: CPT | Performed by: PHYSICIAN ASSISTANT

## 2021-03-19 ASSESSMENT — ENCOUNTER SYMPTOMS
NERVOUS/ANXIOUS: 0
PALPITATIONS: 0
HEMATOCHEZIA: 0
ABDOMINAL PAIN: 0
DYSURIA: 0
JOINT SWELLING: 0
NAUSEA: 0
MYALGIAS: 0
SORE THROAT: 0
HEADACHES: 0
SHORTNESS OF BREATH: 0
CHILLS: 0
DIZZINESS: 0
HEARTBURN: 0
EYE PAIN: 0
HEMATURIA: 0
FREQUENCY: 0
COUGH: 0
DIARRHEA: 0
PARESTHESIAS: 0
CONSTIPATION: 0
FEVER: 0
WEAKNESS: 0

## 2021-03-19 ASSESSMENT — MIFFLIN-ST. JEOR: SCORE: 2015.29

## 2021-03-19 NOTE — PATIENT INSTRUCTIONS
Tetanus shot next year!      Preventive Health Recommendations  Male Ages 40 to 49    Yearly exam:             See your health care provider every year in order to  o   Review health changes.   o   Discuss preventive care.    o   Review your medicines if your doctor has prescribed any.    You should be tested each year for STDs (sexually transmitted diseases) if you re at risk.     Have a cholesterol test every 5 years.     Have a colonoscopy (test for colon cancer) if someone in your family has had colon cancer or polyps before age 50.     After age 45, have a diabetes test (fasting glucose). If you are at risk for diabetes, you should have this test every 3 years.      Talk with your health care provider about whether or not a prostate cancer screening test (PSA) is right for you.    Shots: Get a flu shot each year. Get a tetanus shot every 10 years.     Nutrition:    Eat at least 5 servings of fruits and vegetables daily.     Eat whole-grain bread, whole-wheat pasta and brown rice instead of white grains and rice.     Get adequate Calcium and Vitamin D.     Lifestyle    Exercise for at least 150 minutes a week (30 minutes a day, 5 days a week). This will help you control your weight and prevent disease.     Limit alcohol to one drink per day.     No smoking.     Wear sunscreen to prevent skin cancer.     See your dentist every six months for an exam and cleaning.

## 2021-03-19 NOTE — PROGRESS NOTES
SUBJECTIVE:   CC: Kobe Bravo III is an 45 year old male who presents for preventative health visit.     Patient has been advised of split billing requirements and indicates understanding: Yes  Healthy Habits:     Getting at least 3 servings of Calcium per day:  Yes    Bi-annual eye exam:  Yes    Dental care twice a year:  Yes    Sleep apnea or symptoms of sleep apnea:  Excessive snoring    Diet:  Regular (no restrictions)    Frequency of exercise:  4-5 days/week    Duration of exercise:  45-60 minutes    Taking medications regularly:  Yes    Medication side effects:  Not applicable    PHQ-2 Total Score: 0    Additional concerns today:  No    Kobe Bravo III is a 45 year old male who presents today for annual check up  Continues to deal with some knee/foot problems   -for now just dealing with physical therapy and pain  Weight is up; less exercise lately  More office time in the past year  Using mouth piece for ANGELINE    Continues with therapy  Finding helpful      Today's PHQ-2 Score:   PHQ-2 ( 1999 Pfizer) 3/18/2021   Q1: Little interest or pleasure in doing things 0   Q2: Feeling down, depressed or hopeless 0   PHQ-2 Score 0   Q1: Little interest or pleasure in doing things Not at all   Q2: Feeling down, depressed or hopeless Not at all   PHQ-2 Score 0       Abuse: Current or Past(Physical, Sexual or Emotional)- No  Do you feel safe in your environment? Yes    Have you ever done Advance Care Planning? (For example, a Health Directive, POLST, or a discussion with a medical provider or your loved ones about your wishes): No, advance care planning information given to patient to review.  Patient declined advance care planning discussion at this time.    Social History     Tobacco Use     Smoking status: Never Smoker     Smokeless tobacco: Former User   Substance Use Topics     Alcohol use: Yes     Alcohol/week: 0.0 standard drinks     Comment: on occ       Alcohol Use 3/18/2021   Prescreen: >3 drinks/day or >7  "drinks/week? No   Prescreen: >3 drinks/day or >7 drinks/week? -     Last PSA: No results found for: PSA    Reviewed orders with patient. Reviewed health maintenance and updated orders accordingly - Yes  Lab work is in process    Reviewed and updated as needed this visit by clinical staff  Tobacco  Allergies  Meds  Problems  Med Hx  Surg Hx  Fam Hx  Soc Hx          Reviewed and updated as needed this visit by Provider                    Review of Systems   Constitutional: Negative for chills and fever.   HENT: Negative for congestion, ear pain, hearing loss and sore throat.    Eyes: Negative for pain and visual disturbance.   Respiratory: Negative for cough and shortness of breath.    Cardiovascular: Negative for chest pain, palpitations and peripheral edema.   Gastrointestinal: Negative for abdominal pain, constipation, diarrhea, heartburn, hematochezia and nausea.   Genitourinary: Negative for discharge, dysuria, frequency, genital sores, hematuria, impotence and urgency.   Musculoskeletal: Negative for joint swelling and myalgias.   Skin: Negative for rash.   Neurological: Negative for dizziness, weakness, headaches and paresthesias.   Psychiatric/Behavioral: Negative for mood changes. The patient is not nervous/anxious.        OBJECTIVE:   /86 (BP Location: Right arm, Patient Position: Chair, Cuff Size: Adult Large)   Pulse 103   Temp 98.8  F (37.1  C) (Tympanic)   Resp 16   Ht 1.753 m (5' 9\")   Wt 113 kg (249 lb 1.6 oz)   SpO2 96%   BMI 36.79 kg/m      Physical Exam  GENERAL: healthy, alert and no distress  EYES: Eyes grossly normal to inspection, PERRL and conjunctivae and sclerae normal  HENT: ear canals and TM's normal, nose and mouth without ulcers or lesions  NECK: no adenopathy, no asymmetry, masses, or scars and thyroid normal to palpation  RESP: lungs clear to auscultation - no rales, rhonchi or wheezes  CV: regular rate and rhythm, normal S1 S2, no S3 or S4, no murmur, click or rub, " "no peripheral edema and peripheral pulses strong  ABDOMEN: soft, nontender, no hepatosplenomegaly, no masses and bowel sounds normal  MS: no gross musculoskeletal defects noted, no edema  SKIN: no suspicious lesions or rashes  NEURO: Normal strength and tone, mentation intact and speech normal  PSYCH: mentation appears normal, affect normal/bright    Diagnostic Test Results:  Labs reviewed in Epic    ASSESSMENT/PLAN:   1. Routine general medical examination at a health care facility  Reviewed personal and family history. Reviewed age appropriate screenings. Recommended any needed vaccinations.  - REVIEW OF HEALTH MAINTENANCE PROTOCOL ORDERS    2. ANGELINE (obstructive sleep apnea)  Using dental appliance    3. Morbid obesity (H)  Likely due to foot fracture and meniscal tear - time off fromexercise. Monitor calorie intake and start increased exercise  - Lipid panel reflex to direct LDL Fasting  - TSH with free T4 reflex  - Comprehensive metabolic panel (BMP + Alb, Alk Phos, ALT, AST, Total. Bili, TP)    4. Need for hepatitis C screening test  Per CDC  - Hepatitis C Screen Reflex to HCV RNA Quant and Genotype    5. Screening for HIV (human immunodeficiency virus)  Per cdc  - HIV Antigen Antibody Combo    Patient has been advised of split billing requirements and indicates understanding: Yes  COUNSELING:   Reviewed preventive health counseling, as reflected in patient instructions    Estimated body mass index is 36.79 kg/m  as calculated from the following:    Height as of this encounter: 1.753 m (5' 9\").    Weight as of this encounter: 113 kg (249 lb 1.6 oz).     Weight management plan: Discussed healthy diet and exercise guidelines    He reports that he has never smoked. He quit smokeless tobacco use about 9 years ago.      Counseling Resources:  ATP IV Guidelines  Pooled Cohorts Equation Calculator  FRAX Risk Assessment  ICSI Preventive Guidelines  Dietary Guidelines for Americans, 2010  USDA's MyPlate  ASA " Prophylaxis  Lung CA Screening    KARTHIKEYAN Salguero [facility]

## 2021-09-05 ENCOUNTER — HEALTH MAINTENANCE LETTER (OUTPATIENT)
Age: 46
End: 2021-09-05

## 2021-10-20 ENCOUNTER — E-VISIT (OUTPATIENT)
Dept: FAMILY MEDICINE | Facility: CLINIC | Age: 46
End: 2021-10-20
Payer: COMMERCIAL

## 2021-10-20 DIAGNOSIS — Z20.822 EXPOSURE TO 2019 NOVEL CORONAVIRUS: Primary | ICD-10-CM

## 2021-10-20 DIAGNOSIS — R05.9 COUGH: ICD-10-CM

## 2021-10-20 PROCEDURE — 99421 OL DIG E/M SVC 5-10 MIN: CPT | Performed by: PHYSICIAN ASSISTANT

## 2021-10-21 ENCOUNTER — LAB (OUTPATIENT)
Dept: URGENT CARE | Facility: URGENT CARE | Age: 46
End: 2021-10-21
Attending: PHYSICIAN ASSISTANT
Payer: COMMERCIAL

## 2021-10-21 DIAGNOSIS — R05.9 COUGH: ICD-10-CM

## 2021-10-21 DIAGNOSIS — Z20.822 EXPOSURE TO 2019 NOVEL CORONAVIRUS: ICD-10-CM

## 2021-10-21 LAB — SARS-COV-2 RNA RESP QL NAA+PROBE: NEGATIVE

## 2021-10-21 PROCEDURE — U0005 INFEC AGEN DETEC AMPLI PROBE: HCPCS

## 2021-10-21 PROCEDURE — U0003 INFECTIOUS AGENT DETECTION BY NUCLEIC ACID (DNA OR RNA); SEVERE ACUTE RESPIRATORY SYNDROME CORONAVIRUS 2 (SARS-COV-2) (CORONAVIRUS DISEASE [COVID-19]), AMPLIFIED PROBE TECHNIQUE, MAKING USE OF HIGH THROUGHPUT TECHNOLOGIES AS DESCRIBED BY CMS-2020-01-R: HCPCS

## 2022-06-12 ENCOUNTER — HEALTH MAINTENANCE LETTER (OUTPATIENT)
Age: 47
End: 2022-06-12

## 2022-07-26 ENCOUNTER — TRANSFERRED RECORDS (OUTPATIENT)
Dept: FAMILY MEDICINE | Facility: CLINIC | Age: 47
End: 2022-07-26

## 2022-08-01 ENCOUNTER — TRANSFERRED RECORDS (OUTPATIENT)
Dept: FAMILY MEDICINE | Facility: CLINIC | Age: 47
End: 2022-08-01

## 2022-08-16 ENCOUNTER — TRANSFERRED RECORDS (OUTPATIENT)
Dept: HEALTH INFORMATION MANAGEMENT | Facility: CLINIC | Age: 47
End: 2022-08-16

## 2022-08-24 ASSESSMENT — ENCOUNTER SYMPTOMS
JOINT SWELLING: 1
FEVER: 0
NERVOUS/ANXIOUS: 1
HEMATURIA: 0
HEARTBURN: 0
ABDOMINAL PAIN: 0
SHORTNESS OF BREATH: 0
FREQUENCY: 0
MYALGIAS: 0
WEAKNESS: 0
HEMATOCHEZIA: 0
HEADACHES: 0
SORE THROAT: 0
EYE PAIN: 0
ARTHRALGIAS: 1
DIZZINESS: 0
CONSTIPATION: 0
COUGH: 0
CHILLS: 0
NAUSEA: 0
DIARRHEA: 0
PARESTHESIAS: 0
PALPITATIONS: 0

## 2022-08-25 ENCOUNTER — OFFICE VISIT (OUTPATIENT)
Dept: FAMILY MEDICINE | Facility: CLINIC | Age: 47
End: 2022-08-25
Payer: COMMERCIAL

## 2022-08-25 VITALS
TEMPERATURE: 98.3 F | HEIGHT: 69 IN | WEIGHT: 252 LBS | HEART RATE: 96 BPM | SYSTOLIC BLOOD PRESSURE: 110 MMHG | DIASTOLIC BLOOD PRESSURE: 88 MMHG | RESPIRATION RATE: 16 BRPM | BODY MASS INDEX: 37.33 KG/M2 | OXYGEN SATURATION: 97 %

## 2022-08-25 DIAGNOSIS — E66.01 MORBID OBESITY (H): ICD-10-CM

## 2022-08-25 DIAGNOSIS — Z12.11 SCREEN FOR COLON CANCER: ICD-10-CM

## 2022-08-25 DIAGNOSIS — Z00.00 ROUTINE GENERAL MEDICAL EXAMINATION AT A HEALTH CARE FACILITY: Primary | ICD-10-CM

## 2022-08-25 DIAGNOSIS — F41.1 GAD (GENERALIZED ANXIETY DISORDER): ICD-10-CM

## 2022-08-25 DIAGNOSIS — Z13.6 CARDIOVASCULAR SCREENING; LDL GOAL LESS THAN 160: ICD-10-CM

## 2022-08-25 DIAGNOSIS — G47.33 OSA (OBSTRUCTIVE SLEEP APNEA): ICD-10-CM

## 2022-08-25 LAB
ALBUMIN SERPL-MCNC: 3.7 G/DL (ref 3.4–5)
ALP SERPL-CCNC: 63 U/L (ref 40–150)
ALT SERPL W P-5'-P-CCNC: 72 U/L (ref 0–70)
ANION GAP SERPL CALCULATED.3IONS-SCNC: 5 MMOL/L (ref 3–14)
AST SERPL W P-5'-P-CCNC: 32 U/L (ref 0–45)
BILIRUB SERPL-MCNC: 0.6 MG/DL (ref 0.2–1.3)
BUN SERPL-MCNC: 16 MG/DL (ref 7–30)
CALCIUM SERPL-MCNC: 9.4 MG/DL (ref 8.5–10.1)
CHLORIDE BLD-SCNC: 109 MMOL/L (ref 94–109)
CHOLEST SERPL-MCNC: 197 MG/DL
CO2 SERPL-SCNC: 26 MMOL/L (ref 20–32)
CREAT SERPL-MCNC: 0.9 MG/DL (ref 0.66–1.25)
FASTING STATUS PATIENT QL REPORTED: YES
GFR SERPL CREATININE-BSD FRML MDRD: >90 ML/MIN/1.73M2
GLUCOSE BLD-MCNC: 99 MG/DL (ref 70–99)
HDLC SERPL-MCNC: 67 MG/DL
LDLC SERPL CALC-MCNC: 104 MG/DL
NONHDLC SERPL-MCNC: 130 MG/DL
POTASSIUM BLD-SCNC: 4.5 MMOL/L (ref 3.4–5.3)
PROT SERPL-MCNC: 7.1 G/DL (ref 6.8–8.8)
SODIUM SERPL-SCNC: 140 MMOL/L (ref 133–144)
TRIGL SERPL-MCNC: 130 MG/DL

## 2022-08-25 PROCEDURE — 36415 COLL VENOUS BLD VENIPUNCTURE: CPT | Performed by: PHYSICIAN ASSISTANT

## 2022-08-25 PROCEDURE — 99213 OFFICE O/P EST LOW 20 MIN: CPT | Mod: 25 | Performed by: PHYSICIAN ASSISTANT

## 2022-08-25 PROCEDURE — 90471 IMMUNIZATION ADMIN: CPT | Performed by: PHYSICIAN ASSISTANT

## 2022-08-25 PROCEDURE — 80053 COMPREHEN METABOLIC PANEL: CPT | Performed by: PHYSICIAN ASSISTANT

## 2022-08-25 PROCEDURE — 90715 TDAP VACCINE 7 YRS/> IM: CPT | Performed by: PHYSICIAN ASSISTANT

## 2022-08-25 PROCEDURE — 80061 LIPID PANEL: CPT | Performed by: PHYSICIAN ASSISTANT

## 2022-08-25 PROCEDURE — 99396 PREV VISIT EST AGE 40-64: CPT | Mod: 25 | Performed by: PHYSICIAN ASSISTANT

## 2022-08-25 ASSESSMENT — ENCOUNTER SYMPTOMS
CHILLS: 0
SORE THROAT: 0
NAUSEA: 0
WEAKNESS: 0
FREQUENCY: 0
HEMATURIA: 0
EYE PAIN: 0
FEVER: 0
HEARTBURN: 0
SHORTNESS OF BREATH: 0
PALPITATIONS: 0
ABDOMINAL PAIN: 0
DIARRHEA: 0
PARESTHESIAS: 0
HEMATOCHEZIA: 0
COUGH: 0
CONSTIPATION: 0
ARTHRALGIAS: 1
MYALGIAS: 0
HEADACHES: 0
DIZZINESS: 0
NERVOUS/ANXIOUS: 1
JOINT SWELLING: 1

## 2022-08-25 ASSESSMENT — PAIN SCALES - GENERAL: PAINLEVEL: NO PAIN (0)

## 2022-08-25 NOTE — PROGRESS NOTES
SUBJECTIVE:   CC: Kobe Bravo III is an 47 year old male who presents for preventative health visit.     Patient has been advised of split billing requirements and indicates understanding: Yes  Healthy Habits:     Getting at least 3 servings of Calcium per day:  Yes    Bi-annual eye exam:  Yes    Dental care twice a year:  Yes    Sleep apnea or symptoms of sleep apnea:  Sleep apnea    Diet:  Regular (no restrictions)    Frequency of exercise:  4-5 days/week    Duration of exercise:  45-60 minutes    Taking medications regularly:  Yes    Medication side effects:  Not applicable    PHQ-2 Total Score: 0    Additional concerns today:  Yes    Kobe Bravo III is a 47 year old male who presents today for annual check up  OVerall  Doing well  Cross fit regularly but hoping to add more cardio  Dealing with a talus bone injury and seeing multiple orthos  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, more desk  Anxiety still strongly behind the picture, affecting regularly each week; occasionally thinking he'll drink a few more beers to help  Stopped therapy last summer but had benefitted    Today's PHQ-2 Score:   PHQ-2 ( 1999 Pfizer) 8/24/2022   Q1: Little interest or pleasure in doing things 0   Q2: Feeling down, depressed or hopeless 0   PHQ-2 Score 0   PHQ-2 Total Score (12-17 Years)- Positive if 3 or more points; Administer PHQ-A if positive -   Q1: Little interest or pleasure in doing things Not at all   Q2: Feeling down, depressed or hopeless Not at all   PHQ-2 Score 0       Abuse: Current or Past(Physical, Sexual or Emotional)- No  Do you feel safe in your environment? Yes        Social History     Tobacco Use     Smoking status: Never Smoker     Smokeless tobacco: Former User     Quit date: 6/11/2011   Substance Use Topics     Alcohol use: Yes     Alcohol/week: 0.0 standard drinks     Comment: on occ     If you drink alcohol do you typically have >3 drinks per day or >7 drinks per week? No    Alcohol Use  "8/25/2022   Prescreen: >3 drinks/day or >7 drinks/week? -   Prescreen: >3 drinks/day or >7 drinks/week? No   AUDIT SCORE  -       Last PSA: No results found for: PSA    Reviewed orders with patient. Reviewed health maintenance and updated orders accordingly - Yes  Lab work is in process    Reviewed and updated as needed this visit by clinical staff   Tobacco  Allergies  Meds   Med Hx  Surg Hx  Fam Hx  Soc Hx          Reviewed and updated as needed this visit by Provider                     Review of Systems   Constitutional: Negative for chills and fever.   HENT: Negative for congestion, ear pain, hearing loss and sore throat.    Eyes: Positive for visual disturbance. Negative for pain.   Respiratory: Negative for cough and shortness of breath.    Cardiovascular: Negative for chest pain, palpitations and peripheral edema.   Gastrointestinal: Negative for abdominal pain, constipation, diarrhea, heartburn, hematochezia and nausea.   Genitourinary: Negative for frequency, genital sores, hematuria, impotence and penile discharge.   Musculoskeletal: Positive for arthralgias and joint swelling. Negative for myalgias.   Skin: Negative for rash.   Neurological: Negative for dizziness, weakness, headaches and paresthesias.   Psychiatric/Behavioral: Negative for mood changes. The patient is nervous/anxious.        OBJECTIVE:   /88 (BP Location: Right arm, Patient Position: Sitting, Cuff Size: Adult Large)   Pulse 96   Temp 98.3  F (36.8  C) (Oral)   Resp 16   Ht 1.74 m (5' 8.5\")   Wt 114.3 kg (252 lb)   SpO2 97%   BMI 37.76 kg/m      Physical Exam  GENERAL: healthy, alert and no distress  EYES: Eyes grossly normal to inspection, PERRL and conjunctivae and sclerae normal  HENT: ear canals and TM's normal, nose and mouth without ulcers or lesions  NECK: no adenopathy, no asymmetry, masses, or scars and thyroid normal to palpation  RESP: lungs clear to auscultation - no rales, rhonchi or wheezes  CV: regular " "rate and rhythm, normal S1 S2, no S3 or S4, no murmur, click or rub, no peripheral edema and peripheral pulses strong  ABDOMEN: soft, nontender, without hepatosplenomegaly or masses, bowel sounds normal and small umbilical hernia starting  MS: no gross musculoskeletal defects noted, no edema  SKIN: AK to the scalp  PSYCH: mentation appears normal, affect normal/bright    Diagnostic Test Results:  Labs reviewed in Epic    ASSESSMENT/PLAN:   1. Routine general medical examination at a health care facility  Reviewed personal and family history. Reviewed age appropriate screenings. Recommended any needed vaccinations.    2. FARHAD (generalized anxiety disorder)  Long standing dx; no longer with therapy but would prefer trial of medicaiton. Reviewed r/b/se. Start below and follow up virtual 4-6 weeks  - FLUoxetine (PROZAC) 20 MG capsule; Take 1 capsule (20 mg) by mouth daily Ok to increase to 40mg (2 caps) after 3 weeks as tolerated  Dispense: 90 capsule; Refill: 0    3. Morbid obesity (H)  Reviewed lifestyle changes to help limit development of chronic disease like HTN, HLD and with his ANGELINE  - Comprehensive metabolic panel (BMP + Alb, Alk Phos, ALT, AST, Total. Bili, TP); Future  - Comprehensive metabolic panel (BMP + Alb, Alk Phos, ALT, AST, Total. Bili, TP)    4. ANGELINE (obstructive sleep apnea)  Using mouth piece. He believes this helps    5. Screen for colon cancer  - Colonoscopy Screening  Referral; Future    6. CARDIOVASCULAR SCREENING; LDL GOAL LESS THAN 160  - Lipid panel reflex to direct LDL Fasting; Future  - Lipid panel reflex to direct LDL Fasting        COUNSELING:   Reviewed preventive health counseling, as reflected in patient instructions    Estimated body mass index is 37.76 kg/m  as calculated from the following:    Height as of this encounter: 1.74 m (5' 8.5\").    Weight as of this encounter: 114.3 kg (252 lb).         He reports that he has never smoked. He quit smokeless tobacco use about 11 " years ago.      Counseling Resources:  ATP IV Guidelines  Pooled Cohorts Equation Calculator  FRAX Risk Assessment  ICSI Preventive Guidelines  Dietary Guidelines for Americans, 2010  USDA's MyPlate  ASA Prophylaxis  Lung CA Screening    KARTHIKEYAN Salguero [facility]

## 2022-10-13 ENCOUNTER — TELEPHONE (OUTPATIENT)
Dept: GASTROENTEROLOGY | Facility: CLINIC | Age: 47
End: 2022-10-13

## 2022-10-13 NOTE — TELEPHONE ENCOUNTER
Screening Questions  BLUE  KIND OF PREP RED  LOCATION [review exclusion criteria] GREEN  SEDATION TYPE        Y Are you active on mychart?       Sukumar Lucero PA-C in [facility] Ordering/Referring Provider?        BCBS/HP What type of coverage do you have?      N Have you had a positive covid test in the last 90 days?     38.3 1. BMI  [BMI 40+ - review exclusion criteria]    Y  2. Are you able to give consent for your medical care? [IF NO,RN REVIEW]        N  3. Are you taking any prescription pain medications on a routine schedule?        3a. EXTENDED PREP What kind of prescription?     N 4. Do you have any chemical dependencies such as alcohol, street drugs, or methadone?    N 5. Do you have any history of post-traumatic stress syndrome, severe anxiety or history of psychosis?      **If yes 3- 5 , please schedule with MAC sedation.**          IF YES TO ANY 6 - 10 - HOSPITAL SETTING ONLY.     N 6.   Do you need assistance transferring?     N 7.   Have you had a heart or lung transplant?    N 8.   Are you currently on dialysis?   N 9.   Do you use daily home oxygen?   N 10. Do you take nitroglycerin?   10a.  If yes, how often?     11. [FEMALES]   Are you currently pregnant?    11a.  If yes, how many weeks? [ Greater than 12 weeks, OR NEEDED]    N 12. Do you have Pulmonary Hypertension? *NEED PAC APPT AT [facility]*     N 13. [review exclusion criteria]  Do you have any implantable devices in your body (pacemaker, defib, LVAD)?    N 14. In the past 6 months, have you had any heart related issues including cardiomyopathy or heart attack?     14a.  If yes, did it require cardiac stenting if so when?     N 15. Have you had a stroke or Transient ischemic attack (TIA - aka  mini stroke ) within 6 months?      N 16. Do you have mod to severe Obstructive Sleep Apnea?  [Hospital only - Ok at [facility]]    N 17. Do you have SEVERE AND UNCONTROLLED asthma? *NEED PAC APPT AT [facility]*     N 18. Are you currently taking any  "blood thinners?     18a. If yes, inform patient to \"follow up w/ ordering provider for bridging instructions.\"    N 19. Do you take the medication Phentermine?    19a. If yes, \"Hold for 7 days before procedure.  Please consult your prescribing provider if you have questions about holding this medication.\"     N  20. Do you have chronic kidney disease?      N  21. Do you have a diagnosis of diabetes?     N  22. On a regular basis do you go 3-5 days between bowel movements?      23. Preferred LOCAL Pharmacy for Pre Prescription    [ LIST ONLY ONE PHARMACY]        [id number] - [place], [place] - [address]        - CLOSING REMINDERS -    Informed patient they will need an adult    Cannot take any type of public or medical transportation alone    Conscious Sedation- Needs  for 6 hours after the procedure       MAC/General-Needs  for 24 hours after procedure    Pre-Procedure Covid test to be completed [[facility] PCR Testing Required]    Confirmed Nurse will call to complete assessment       - SCHEDULING DETAILS -     Lety  Surgeon    11/3  Date of Procedure  Lower Endoscopy [Colonoscopy]  Type of Procedure Scheduled   [facility]If you answer yes to questions #8, #20, #21Which Colonoscopy Prep was Sent?     CS Sedation Type     N PAC / Pre-op Required         Additional comments:            "

## 2022-10-17 RX ORDER — BISACODYL 5 MG
TABLET, DELAYED RELEASE (ENTERIC COATED) ORAL
Qty: 4 TABLET | Refills: 0 | Status: SHIPPED | OUTPATIENT
Start: 2022-10-17 | End: 2023-04-10

## 2022-10-23 ENCOUNTER — HEALTH MAINTENANCE LETTER (OUTPATIENT)
Age: 47
End: 2022-10-23

## 2022-11-03 ENCOUNTER — APPOINTMENT (OUTPATIENT)
Dept: SURGERY | Facility: PHYSICIAN GROUP | Age: 47
End: 2022-11-03
Payer: COMMERCIAL

## 2022-11-03 ENCOUNTER — HOSPITAL ENCOUNTER (OUTPATIENT)
Facility: CLINIC | Age: 47
Discharge: HOME | End: 2022-11-03
Attending: STUDENT IN AN ORGANIZED HEALTH CARE EDUCATION/TRAINING PROGRAM | Admitting: STUDENT IN AN ORGANIZED HEALTH CARE EDUCATION/TRAINING PROGRAM
Payer: COMMERCIAL

## 2022-11-03 VITALS
TEMPERATURE: 97.9 F | OXYGEN SATURATION: 96 % | DIASTOLIC BLOOD PRESSURE: 78 MMHG | RESPIRATION RATE: 16 BRPM | HEIGHT: 69 IN | SYSTOLIC BLOOD PRESSURE: 149 MMHG | BODY MASS INDEX: 37.33 KG/M2 | HEART RATE: 81 BPM | WEIGHT: 252 LBS

## 2022-11-03 DIAGNOSIS — Z12.11 SPECIAL SCREENING FOR MALIGNANT NEOPLASMS, COLON: Primary | ICD-10-CM

## 2022-11-03 LAB — COLONOSCOPY: NORMAL

## 2022-11-03 PROCEDURE — 45378 DIAGNOSTIC COLONOSCOPY: CPT | Performed by: STUDENT IN AN ORGANIZED HEALTH CARE EDUCATION/TRAINING PROGRAM

## 2022-11-03 PROCEDURE — G0500 MOD SEDAT ENDO SERVICE >5YRS: HCPCS | Performed by: STUDENT IN AN ORGANIZED HEALTH CARE EDUCATION/TRAINING PROGRAM

## 2022-11-03 PROCEDURE — G0121 COLON CA SCRN NOT HI RSK IND: HCPCS | Performed by: STUDENT IN AN ORGANIZED HEALTH CARE EDUCATION/TRAINING PROGRAM

## 2022-11-03 PROCEDURE — 250N000013 HC RX MED GY IP 250 OP 250 PS 637: Performed by: STUDENT IN AN ORGANIZED HEALTH CARE EDUCATION/TRAINING PROGRAM

## 2022-11-03 PROCEDURE — 250N000011 HC RX IP 250 OP 636: Performed by: STUDENT IN AN ORGANIZED HEALTH CARE EDUCATION/TRAINING PROGRAM

## 2022-11-03 RX ORDER — FENTANYL CITRATE 0.05 MG/ML
50-100 INJECTION, SOLUTION INTRAMUSCULAR; INTRAVENOUS EVERY 5 MIN PRN
Status: DISCONTINUED | OUTPATIENT
Start: 2022-11-03 | End: 2022-11-03 | Stop reason: HOSPADM

## 2022-11-03 RX ORDER — NALOXONE HYDROCHLORIDE 0.4 MG/ML
0.4 INJECTION, SOLUTION INTRAMUSCULAR; INTRAVENOUS; SUBCUTANEOUS
Status: DISCONTINUED | OUTPATIENT
Start: 2022-11-03 | End: 2022-11-03 | Stop reason: HOSPADM

## 2022-11-03 RX ORDER — LIDOCAINE 40 MG/G
CREAM TOPICAL
Status: DISCONTINUED | OUTPATIENT
Start: 2022-11-03 | End: 2022-11-03 | Stop reason: HOSPADM

## 2022-11-03 RX ORDER — NALOXONE HYDROCHLORIDE 0.4 MG/ML
0.2 INJECTION, SOLUTION INTRAMUSCULAR; INTRAVENOUS; SUBCUTANEOUS
Status: DISCONTINUED | OUTPATIENT
Start: 2022-11-03 | End: 2022-11-03 | Stop reason: HOSPADM

## 2022-11-03 RX ORDER — ONDANSETRON 4 MG/1
4 TABLET, ORALLY DISINTEGRATING ORAL EVERY 6 HOURS PRN
Status: DISCONTINUED | OUTPATIENT
Start: 2022-11-03 | End: 2022-11-03 | Stop reason: HOSPADM

## 2022-11-03 RX ORDER — PROCHLORPERAZINE MALEATE 10 MG
10 TABLET ORAL EVERY 6 HOURS PRN
Status: DISCONTINUED | OUTPATIENT
Start: 2022-11-03 | End: 2022-11-03 | Stop reason: HOSPADM

## 2022-11-03 RX ORDER — ONDANSETRON 2 MG/ML
4 INJECTION INTRAMUSCULAR; INTRAVENOUS
Status: DISCONTINUED | OUTPATIENT
Start: 2022-11-03 | End: 2022-11-03 | Stop reason: HOSPADM

## 2022-11-03 RX ORDER — ONDANSETRON 2 MG/ML
4 INJECTION INTRAMUSCULAR; INTRAVENOUS EVERY 6 HOURS PRN
Status: DISCONTINUED | OUTPATIENT
Start: 2022-11-03 | End: 2022-11-03 | Stop reason: HOSPADM

## 2022-11-03 RX ORDER — DIPHENHYDRAMINE HYDROCHLORIDE 50 MG/ML
25-50 INJECTION INTRAMUSCULAR; INTRAVENOUS
Status: DISCONTINUED | OUTPATIENT
Start: 2022-11-03 | End: 2022-11-03 | Stop reason: HOSPADM

## 2022-11-03 RX ORDER — ATROPINE SULFATE 0.1 MG/ML
1 INJECTION INTRAVENOUS
Status: DISCONTINUED | OUTPATIENT
Start: 2022-11-03 | End: 2022-11-03 | Stop reason: HOSPADM

## 2022-11-03 RX ORDER — EPINEPHRINE 1 MG/ML
0.1 INJECTION, SOLUTION INTRAMUSCULAR; SUBCUTANEOUS
Status: DISCONTINUED | OUTPATIENT
Start: 2022-11-03 | End: 2022-11-03 | Stop reason: HOSPADM

## 2022-11-03 RX ORDER — SIMETHICONE 40MG/0.6ML
133 SUSPENSION, DROPS(FINAL DOSAGE FORM)(ML) ORAL
Status: COMPLETED | OUTPATIENT
Start: 2022-11-03 | End: 2022-11-03

## 2022-11-03 RX ORDER — FLUMAZENIL 0.1 MG/ML
0.2 INJECTION, SOLUTION INTRAVENOUS
Status: DISCONTINUED | OUTPATIENT
Start: 2022-11-03 | End: 2022-11-03 | Stop reason: HOSPADM

## 2022-11-03 RX ADMIN — SIMETHICONE 133 MG: 20 EMULSION ORAL at 08:48

## 2022-11-03 RX ADMIN — FENTANYL CITRATE 100 MCG: 50 INJECTION INTRAMUSCULAR; INTRAVENOUS at 08:42

## 2022-11-03 RX ADMIN — MIDAZOLAM HYDROCHLORIDE 2 MG: 1 INJECTION, SOLUTION INTRAMUSCULAR; INTRAVENOUS at 08:42

## 2022-11-03 ASSESSMENT — ACTIVITIES OF DAILY LIVING (ADL): ADLS_ACUITY_SCORE: 35

## 2022-11-03 NOTE — H&P
"[facility] Anesthesia Pre-op History and Physical    Kobe Bravo III MRN# [id number]   Age: 47 year old YOB: 1975      Date of Surgery: 11/03/22   [facility]      Date of Exam 11/3/2022 Facility (Same day)       Primary care provider: Sukumar Lucero         Chief Complaint and/or Reason for Procedure:   screening colonoscopy         Active problem list:     Patient Active Problem List    Diagnosis Date Noted     Morbid obesity (H) 03/19/2021     Priority: Medium     ANGELINE (obstructive sleep apnea) 02/18/2019     Priority: Medium     Using dental appliance         CARDIOVASCULAR SCREENING; LDL GOAL LESS THAN 160 10/31/2010     Priority: Medium            Medications (include herbals and vitamins):     Current Outpatient Medications   Medication Instructions     bisacodyl (DULCOLAX) 5 MG EC tablet Take 2 tablets at 3 pm the day before your procedure. If your procedure is before 11 am, take 2 additional tablets at 11 pm. If your procedure is after 11 am, take 2 additional tablets at 6 am. For additional instructions refer to your colonoscopy prep instructions.     FLUoxetine (PROZAC) 20 mg, Oral, DAILY, Ok to increase to 40mg (2 caps) after 3 weeks as tolerated     polyethylene glycol (GOLYTELY) 236 g suspension The night before the exam at 6 pm drink an 8-ounce glass every 15 minutes until the jug is half empty. If you arrive before 11 AM: Drink the other half of the Golytely jug at 11 PM night before procedure. If you arrive after 11 AM: Drink the other half of the Golytely jug at 6 AM day of procedure. For additional instructions refer to your colonoscopy prep instructions.                Allergies:    No Known Allergies            Physical Exam:   All vitals have been reviewed  Patient Vitals for the past 8 hrs:   BP Temp Temp src Pulse Resp SpO2 Height Weight   11/03/22 0801 131/88 97.9  F (36.6  C) Temporal 85 12 96 % 1.74 m (5' 8.5\") 114.3 kg (252 lb) "     Airway assessment:   Mallampatti classification: Class I (visualization of the soft palate, fauces, uvula, anterior and posterior pillars)}     Lungs:   No increased work of breathing, good air exchange, clear to auscultation bilaterally     Cardiovascular:   regular rate and rhythm             Lab / Radiology Results:   COVID-19 negative        Anesthetic risk and/or ASA classification:   Class I     Robel Davidson MD

## 2023-01-31 ENCOUNTER — MYC MEDICAL ADVICE (OUTPATIENT)
Dept: FAMILY MEDICINE | Facility: CLINIC | Age: 48
End: 2023-01-31
Payer: COMMERCIAL

## 2023-01-31 DIAGNOSIS — G47.33 OSA (OBSTRUCTIVE SLEEP APNEA): Primary | ICD-10-CM

## 2023-04-10 ENCOUNTER — OFFICE VISIT (OUTPATIENT)
Dept: FAMILY MEDICINE | Facility: CLINIC | Age: 48
End: 2023-04-10
Payer: COMMERCIAL

## 2023-04-10 ENCOUNTER — NURSE TRIAGE (OUTPATIENT)
Dept: FAMILY MEDICINE | Facility: CLINIC | Age: 48
End: 2023-04-10

## 2023-04-10 VITALS
RESPIRATION RATE: 21 BRPM | OXYGEN SATURATION: 97 % | TEMPERATURE: 97.9 F | DIASTOLIC BLOOD PRESSURE: 88 MMHG | BODY MASS INDEX: 37.75 KG/M2 | WEIGHT: 254.9 LBS | HEIGHT: 69 IN | SYSTOLIC BLOOD PRESSURE: 146 MMHG | HEART RATE: 118 BPM

## 2023-04-10 DIAGNOSIS — F41.1 GAD (GENERALIZED ANXIETY DISORDER): Primary | ICD-10-CM

## 2023-04-10 DIAGNOSIS — G47.00 INSOMNIA, UNSPECIFIED TYPE: ICD-10-CM

## 2023-04-10 PROCEDURE — 99213 OFFICE O/P EST LOW 20 MIN: CPT | Performed by: NURSE PRACTITIONER

## 2023-04-10 RX ORDER — HYDROXYZINE HYDROCHLORIDE 25 MG/1
25 TABLET, FILM COATED ORAL 3 TIMES DAILY PRN
Qty: 30 TABLET | Refills: 0 | Status: SHIPPED | OUTPATIENT
Start: 2023-04-10 | End: 2023-04-24

## 2023-04-10 RX ORDER — HYDROXYZINE HYDROCHLORIDE 25 MG/1
25 TABLET, FILM COATED ORAL 3 TIMES DAILY PRN
Qty: 30 TABLET | Refills: 0 | Status: SHIPPED | OUTPATIENT
Start: 2023-04-10 | End: 2023-04-10

## 2023-04-10 RX ORDER — ESCITALOPRAM OXALATE 10 MG/1
10 TABLET ORAL DAILY
Qty: 30 TABLET | Refills: 0 | Status: SHIPPED | OUTPATIENT
Start: 2023-04-10 | End: 2023-04-24

## 2023-04-10 RX ORDER — ESCITALOPRAM OXALATE 10 MG/1
10 TABLET ORAL DAILY
Qty: 30 TABLET | Refills: 0 | Status: SHIPPED | OUTPATIENT
Start: 2023-04-10 | End: 2023-04-10

## 2023-04-10 ASSESSMENT — ANXIETY QUESTIONNAIRES
2. NOT BEING ABLE TO STOP OR CONTROL WORRYING: SEVERAL DAYS
7. FEELING AFRAID AS IF SOMETHING AWFUL MIGHT HAPPEN: SEVERAL DAYS
4. TROUBLE RELAXING: SEVERAL DAYS
5. BEING SO RESTLESS THAT IT IS HARD TO SIT STILL: SEVERAL DAYS
3. WORRYING TOO MUCH ABOUT DIFFERENT THINGS: SEVERAL DAYS
6. BECOMING EASILY ANNOYED OR IRRITABLE: SEVERAL DAYS
8. IF YOU CHECKED OFF ANY PROBLEMS, HOW DIFFICULT HAVE THESE MADE IT FOR YOU TO DO YOUR WORK, TAKE CARE OF THINGS AT HOME, OR GET ALONG WITH OTHER PEOPLE?: SOMEWHAT DIFFICULT
GAD7 TOTAL SCORE: 7
IF YOU CHECKED OFF ANY PROBLEMS ON THIS QUESTIONNAIRE, HOW DIFFICULT HAVE THESE PROBLEMS MADE IT FOR YOU TO DO YOUR WORK, TAKE CARE OF THINGS AT HOME, OR GET ALONG WITH OTHER PEOPLE: SOMEWHAT DIFFICULT
GAD7 TOTAL SCORE: 7
GAD7 TOTAL SCORE: 7
1. FEELING NERVOUS, ANXIOUS, OR ON EDGE: SEVERAL DAYS
7. FEELING AFRAID AS IF SOMETHING AWFUL MIGHT HAPPEN: SEVERAL DAYS

## 2023-04-10 ASSESSMENT — PATIENT HEALTH QUESTIONNAIRE - PHQ9
10. IF YOU CHECKED OFF ANY PROBLEMS, HOW DIFFICULT HAVE THESE PROBLEMS MADE IT FOR YOU TO DO YOUR WORK, TAKE CARE OF THINGS AT HOME, OR GET ALONG WITH OTHER PEOPLE: SOMEWHAT DIFFICULT
SUM OF ALL RESPONSES TO PHQ QUESTIONS 1-9: 10
SUM OF ALL RESPONSES TO PHQ QUESTIONS 1-9: 10

## 2023-04-10 ASSESSMENT — ENCOUNTER SYMPTOMS: NERVOUS/ANXIOUS: 1

## 2023-04-10 ASSESSMENT — PAIN SCALES - GENERAL: PAINLEVEL: NO PAIN (0)

## 2023-04-10 NOTE — PROGRESS NOTES
"  Assessment & Plan     FARHAD (generalized anxiety disorder)  Insomnia, unspecified type  Chronic unstable; patient was seen August 2022 and started on prozac, did not like how he felt on the medication and had discontinued. Continues to have ruminating thoughts and insomnia. Will start on lexapro and advised to follow up in 2-4 weeks for mood check. Also provided as needed hydroxyzine to help with sleep and/or breakthrough anxiety. Patient has been in counseling before and recently stopped in Janurary feels it was not beneficial.     - escitalopram (LEXAPRO) 10 MG tablet; Take 1 tablet (10 mg) by mouth daily  - hydrOXYzine (ATARAX) 25 MG tablet; Take 1 tablet (25 mg) by mouth 3 times daily as needed for anxiety         BMI:   Estimated body mass index is 38.19 kg/m  as calculated from the following:    Height as of this encounter: 1.74 m (5' 8.5\").    Weight as of this encounter: 115.6 kg (254 lb 14.4 oz).   Weight management plan: Discussed healthy diet and exercise guidelines    Depression Screening Follow Up        4/10/2023     1:45 PM   PHQ   PHQ-9 Total Score 10   Q9: Thoughts of better off dead/self-harm past 2 weeks Not at all         4/10/2023     1:45 PM   Last PHQ-9   1.  Little interest or pleasure in doing things 1   2.  Feeling down, depressed, or hopeless 2   3.  Trouble falling or staying asleep, or sleeping too much 2   4.  Feeling tired or having little energy 1   5.  Poor appetite or overeating 1   6.  Feeling bad about yourself 2   7.  Trouble concentrating 1   8.  Moving slowly or restless 0   Q9: Thoughts of better off dead/self-harm past 2 weeks 0   PHQ-9 Total Score 10       Follow Up Actions Taken  Patient declined referral.       SUMEET Aguayo CNP  [facility] XIOMARA Boo is a 47 year old, presenting for the following health issues:  Anxiety        4/10/2023     2:00 PM   Additional Questions   Roomed by Mary Carter   Accompanied by self         4/10/2023 "     2:00 PM   Patient Reported Additional Medications   Patient reports taking the following new medications none     Anxiety    History of Present Illness       Mental Health Follow-up:  Patient presents to follow-up on Depression & Anxiety.Patient's depression since last visit has been:  Worse  The patient is having other symptoms associated with depression.  Patient's anxiety since last visit has been:  Medium  The patient is having other symptoms associated with anxiety.  Any significant life events: relationship concerns, job concerns and grief or loss  Patient is feeling anxious or having panic attacks.  Patient has concerns about alcohol or drug use.    He eats 2-3 servings of fruits and vegetables daily.He consumes 0 sweetened beverage(s) daily.He exercises with enough effort to increase his heart rate 30 to 60 minutes per day.  He exercises with enough effort to increase his heart rate 4 days per week.   He is taking medications regularly.    Today's PHQ-9         PHQ-9 Total Score: 10    PHQ-9 Q9 Thoughts of better off dead/self-harm past 2 weeks :   Not at all    How difficult have these problems made it for you to do your work, take care of things at home, or get along with other people: Somewhat difficult  Today's FARHAD-7 Score: 7     Patient presents to clinic for ruminating thoughts, feels he cannot shut down at night, 4 hours of sleep typically; mother passed away unexpectedly on [place].     Was prescribed Prozac last summer would take in the am and felt like a zombie, then started in the evening and would wake up at 3am; tried melatonin but does not help. Has used some of his wife's ambien and felt like that helped him.     Works for the [employer]; lives with spouse and two children. States his wife has noticed his anxious moods and insomnia and would like him to receive some additional help.     Coffee in the morning, couple beers and couple shots a day; occasional/rare THC and tobacco use.       Review  "of Systems   Psychiatric/Behavioral: The patient is nervous/anxious.           Objective    BP (!) 146/88 (BP Location: Right arm, Patient Position: Sitting, Cuff Size: Adult Large)   Pulse 118   Temp 97.9  F (36.6  C) (Oral)   Resp 21   Ht 1.74 m (5' 8.5\")   Wt 115.6 kg (254 lb 14.4 oz)   SpO2 97%   BMI 38.19 kg/m    Body mass index is 38.19 kg/m .  Physical Exam   GENERAL: healthy, alert and no distress  PSYCH: mentation appears normal and tearful            "

## 2023-04-10 NOTE — TELEPHONE ENCOUNTER
"Scheduled patient for today in acute appointment slot at  with Mary Harris, arrive at 1:40 pm.    AGA Ye on 4/10/2023 at 8:50 AM      Reason for Disposition    Symptoms interfere with work or school    Additional Information    Negative: Substance use (drug use) or unhealthy alcohol use, known or suspected, and feeling very shaky (i.e., visible tremors of hands)    Answer Assessment - Initial Assessment Questions  1. CONCERN: \"Did anything happen that prompted you to call today?\"       More insomnia, restlesness more carmina usual  2. ANXIETY SYMPTOMS: \"Can you describe how you (your loved one; patient) have been feeling?\" (e.g., tense, restless, panicky, anxious, keyed up, overwhelmed, sense of impending doom).       Restless, anxious  3. ONSET: \"How long have you been feeling this way?\" (e.g., hours, days, weeks)      Got worse over the weekend  4. SEVERITY: \"How would you rate the level of anxiety?\" (e.g., 0 - 10; or mild, moderate, severe).      7/10  5. FUNCTIONAL IMPAIRMENT: \"How have these feelings affected your ability to do daily activities?\" \"Have you had more difficulty than usual doing your normal daily activities?\" (e.g., getting better, same, worse; self-care, school, work, interactions)      Yes, insomnia  6. HISTORY: \"Have you felt this way before?\" \"Have you ever been diagnosed with an anxiety problem in the past?\" (e.g., generalized anxiety disorder, panic attacks, PTSD). If Yes, ask: \"How was this problem treated?\" (e.g., medicines, counseling, etc.)      Yes, but rx did not work out  7. RISK OF HARM - SUICIDAL IDEATION: \"Do you ever have thoughts of hurting or killing yourself?\" If Yes, ask:  \"Do you have these feelings now?\" \"Do you have a plan on how you would do this?\"      No  8. TREATMENT:  \"What has been done so far to treat this anxiety?\" (e.g., medicines, relaxation strategies). \"What has helped?\"      Nothing   9. TREATMENT - THERAPIST: \"Do you have a counselor or therapist? Name?\"    " "  Not recently   10. POTENTIAL TRIGGERS: \"Do you drink caffeinated beverages (e.g., coffee, danuta, teas), and how much daily?\" \"Do you drink alcohol or use any drugs?\" \"Have you started any new medicines recently?\"      Yes caffiene 2 cups in the morning, yes- daily about 4-5, no new meds  10. PATIENT SUPPORT: \"Who is with you now?\" \"Who do you live with?\" \"Do you have family or friends who you can talk to?\"         Yes, some friends and wife  11. OTHER SYMPTOMS: \"Do you have any other symptoms?\" (e.g., feeling depressed, trouble concentrating, trouble sleeping, trouble breathing, palpitations or fast heartbeat, chest pain, sweating, nausea, or diarrhea)        Loss of apetite, some depression  12. PREGNANCY: \"Is there any chance you are pregnant?\" \"When was your last menstrual period?\"        NA    Protocols used: ANXIETY AND PANIC ATTACK-A-OH      "

## 2023-04-18 ENCOUNTER — TRANSFERRED RECORDS (OUTPATIENT)
Dept: HEALTH INFORMATION MANAGEMENT | Facility: CLINIC | Age: 48
End: 2023-04-18
Payer: COMMERCIAL

## 2023-04-24 ENCOUNTER — VIRTUAL VISIT (OUTPATIENT)
Dept: FAMILY MEDICINE | Facility: CLINIC | Age: 48
End: 2023-04-24
Payer: COMMERCIAL

## 2023-04-24 DIAGNOSIS — F41.1 GAD (GENERALIZED ANXIETY DISORDER): ICD-10-CM

## 2023-04-24 PROCEDURE — 99213 OFFICE O/P EST LOW 20 MIN: CPT | Mod: VID | Performed by: PHYSICIAN ASSISTANT

## 2023-04-24 RX ORDER — HYDROXYZINE HYDROCHLORIDE 25 MG/1
25 TABLET, FILM COATED ORAL AT BEDTIME
Qty: 30 TABLET | Refills: 1 | Status: SHIPPED | OUTPATIENT
Start: 2023-04-24 | End: 2023-09-11

## 2023-04-24 RX ORDER — ESCITALOPRAM OXALATE 10 MG/1
10 TABLET ORAL DAILY
Qty: 90 TABLET | Refills: 0 | Status: SHIPPED | OUTPATIENT
Start: 2023-04-24 | End: 2023-09-11

## 2023-04-24 ASSESSMENT — PATIENT HEALTH QUESTIONNAIRE - PHQ9
10. IF YOU CHECKED OFF ANY PROBLEMS, HOW DIFFICULT HAVE THESE PROBLEMS MADE IT FOR YOU TO DO YOUR WORK, TAKE CARE OF THINGS AT HOME, OR GET ALONG WITH OTHER PEOPLE: SOMEWHAT DIFFICULT
SUM OF ALL RESPONSES TO PHQ QUESTIONS 1-9: 4
SUM OF ALL RESPONSES TO PHQ QUESTIONS 1-9: 4

## 2023-04-24 ASSESSMENT — ANXIETY QUESTIONNAIRES
4. TROUBLE RELAXING: SEVERAL DAYS
7. FEELING AFRAID AS IF SOMETHING AWFUL MIGHT HAPPEN: NOT AT ALL
6. BECOMING EASILY ANNOYED OR IRRITABLE: NOT AT ALL
GAD7 TOTAL SCORE: 3
IF YOU CHECKED OFF ANY PROBLEMS ON THIS QUESTIONNAIRE, HOW DIFFICULT HAVE THESE PROBLEMS MADE IT FOR YOU TO DO YOUR WORK, TAKE CARE OF THINGS AT HOME, OR GET ALONG WITH OTHER PEOPLE: SOMEWHAT DIFFICULT
3. WORRYING TOO MUCH ABOUT DIFFERENT THINGS: SEVERAL DAYS
5. BEING SO RESTLESS THAT IT IS HARD TO SIT STILL: NOT AT ALL
1. FEELING NERVOUS, ANXIOUS, OR ON EDGE: SEVERAL DAYS
8. IF YOU CHECKED OFF ANY PROBLEMS, HOW DIFFICULT HAVE THESE MADE IT FOR YOU TO DO YOUR WORK, TAKE CARE OF THINGS AT HOME, OR GET ALONG WITH OTHER PEOPLE?: SOMEWHAT DIFFICULT
GAD7 TOTAL SCORE: 3
7. FEELING AFRAID AS IF SOMETHING AWFUL MIGHT HAPPEN: NOT AT ALL
GAD7 TOTAL SCORE: 3
2. NOT BEING ABLE TO STOP OR CONTROL WORRYING: NOT AT ALL

## 2023-04-24 NOTE — PROGRESS NOTES
Rajeev is a 47 year old who is being evaluated via a billable video visit.      How would you like to obtain your AVS? MyChart  If the video visit is dropped, the invitation should be resent by: Text to cell phone: [phone]  Will anyone else be joining your video visit? No          Assessment & Plan     FARHAD (generalized anxiety disorder)  Did not tolerate fluoxetine. Mostly sleep issues and not overly effective for anxiety. Switched to lexapro and tolerating better. Rumination is improving, sleep better; using hydroxyzine prn.   Will refill lexapro 10mg daily and hydroxyzine 25mg prn. Send note in 3 months with update        Sukumar Lucero PA-C  [facility] [place]    Subjective   Rajeev is a 47 year old, presenting for the following health issues:  Follow Up        4/24/2023     1:07 PM   Additional Questions   Roomed by RL   Accompanied by Self         4/24/2023     1:07 PM   Patient Reported Additional Medications   Patient reports taking the following new medications None     History of Present Illness       Mental Health Follow-up:  Patient presents to follow-up on Depression & Anxiety.Patient's depression since last visit has been:  Better  The patient is not having other symptoms associated with depression.  Patient's anxiety since last visit has been:  Better  The patient is not having other symptoms associated with anxiety.  Any significant life events: grief or loss  Patient is not feeling anxious or having panic attacks.  Patient has concerns about alcohol or drug use.He consumes 0 sweetened beverage(s) daily. He exercises with enough effort to increase his heart rate 4 days per week.   He is taking medications regularly.    Today's PHQ-9         PHQ-9 Total Score: 4    PHQ-9 Q9 Thoughts of better off dead/self-harm past 2 weeks :   Not at all    How difficult have these problems made it for you to do your work, take care of things at home, or get along with other people: Somewhat  difficult  Today's FARHAD-7 Score: 3       Kobe Bravo III is a 47 year old male who presents today for VIRTUAL VISIT follow up to discuss moods  We had started him on fluoxetine in August but did not tolerated so stopped  Then a lot of family/work stress that increased and eventually came to a head  Fluoxetine was causing some insomnia  Started on lexapro recently -- he is noting some improvements early on  He did use hydroxyzine a few days initially but has found not needing as much  Not experiencing sleep hangover  More able to let go at end of the day        Review of Systems   Constitutional, HEENT, cardiovascular, pulmonary, gi and gu systems are negative, except as otherwise noted.      Objective           Vitals:  No vitals were obtained today due to virtual visit.    Physical Exam   GENERAL: Healthy, alert and no distress  EYES: Eyes grossly normal to inspection.  No discharge or erythema, or obvious scleral/conjunctival abnormalities.  RESP: No audible wheeze, cough, or visible cyanosis.  No visible retractions or increased work of breathing.    SKIN: Visible skin clear. No significant rash, abnormal pigmentation or lesions.  NEURO: Cranial nerves grossly intact.  Mentation and speech appropriate for age.  PSYCH: Mentation appears normal, affect normal/bright, judgement and insight intact, normal speech and appearance well-groomed.                Video-Visit Details    Type of service:  Video Visit     Originating Location (pt. Location): Home    Distant Location (provider location):  On-site  Platform used for Video Visit: Ghazala

## 2023-05-19 ENCOUNTER — OFFICE VISIT (OUTPATIENT)
Dept: SLEEP MEDICINE | Facility: CLINIC | Age: 48
End: 2023-05-19
Attending: PHYSICIAN ASSISTANT
Payer: COMMERCIAL

## 2023-05-19 VITALS
BODY MASS INDEX: 37.98 KG/M2 | OXYGEN SATURATION: 95 % | HEART RATE: 84 BPM | SYSTOLIC BLOOD PRESSURE: 123 MMHG | DIASTOLIC BLOOD PRESSURE: 83 MMHG | WEIGHT: 253.5 LBS

## 2023-05-19 DIAGNOSIS — F41.1 GAD (GENERALIZED ANXIETY DISORDER): ICD-10-CM

## 2023-05-19 DIAGNOSIS — G47.52 DREAM ENACTMENT BEHAVIOR: ICD-10-CM

## 2023-05-19 DIAGNOSIS — E66.01 MORBID OBESITY (H): ICD-10-CM

## 2023-05-19 DIAGNOSIS — G47.33 OSA (OBSTRUCTIVE SLEEP APNEA): Primary | ICD-10-CM

## 2023-05-19 PROBLEM — F41.9 ANXIETY: Status: ACTIVE | Noted: 2022-01-01

## 2023-05-19 PROCEDURE — 99205 OFFICE O/P NEW HI 60 MIN: CPT | Performed by: PHYSICIAN ASSISTANT

## 2023-05-19 NOTE — NURSING NOTE
"Chief Complaint   Patient presents with     Sleep Problem     Referral for ANGELINE, previous SS       Initial BP (!) 149/90   Pulse 84   Wt 115 kg (253 lb 8 oz)   SpO2 95%   BMI 37.98 kg/m   Estimated body mass index is 37.98 kg/m  as calculated from the following:    Height as of 4/10/23: 1.74 m (5' 8.5\").    Weight as of this encounter: 115 kg (253 lb 8 oz).    Medication Reconciliation: complete    Neck circumference: 17 inches / 43 centimeters.    ESS 4    LYNN 19    Andrea Apodaca CMA (AAMA)  "

## 2023-05-19 NOTE — PATIENT INSTRUCTIONS
"      MY TREATMENT INFORMATION FOR SLEEP APNEA-  Kobe Bravo III  Frequently asked questions:  1. What is Obstructive Sleep Apnea (ANGELINE)? ANGELINE is the most common type of sleep apnea. Apnea means, \"without breath.\"  Apnea is most often caused by narrowing or collapse of the upper airway as muscles relax during sleep.   Almost everyone has occasional apneas. Most people with sleep apnea have had brief interruptions at night frequently for many years.  The severity of sleep apnea is related to how frequent and severe the events are.   2. What are the consequences of ANGELINE? Symptoms include: feeling sleepy during the day, snoring loudly, gasping or stopping of breathing, trouble sleeping, and occasionally morning headaches or heartburn at night.  Sleepiness can be serious and even increase the risk of falling asleep while driving. Other health consequences may include development of high blood pressure and other cardiovascular disease in persons who are susceptible. Untreated ANGELINE  can contribute to heart disease, stroke and diabetes.   3. What are the treatment options? In most situations, sleep apnea is a lifelong disease that must be managed with daily therapy. Medications are not effective for sleep apnea and surgery is generally not considered until other therapies have been tried. Your treatment is your choice . Continuous Positive Airway (CPAP) works right away and is the therapy that is effective in nearly everyone. An oral device to hold your jaw forward is usually the next most reliable option. Other options include postioning devices (to keep you off your back), weight loss, and surgery including a tongue pacing device. There is more detail about some of these options below.  4. Are my sleep studies covered by insurance? Although we will request verification of coverage, we advise you also check in advance of the study to ensure there is coverage.    Important tips for those choosing CPAP and similar " devices  For new devices, sign up for device GABI to monitor your device for your followup visits  We encourage you to utilize the [facility] gabi or website ([employer] web (resmed.com) ) to monitor your therapy progress and share the data with your healthcare team when you discuss your sleep apnea.                                                    Know your equipment:  CPAP is continuous positive airway pressure that prevents obstructive sleep apnea by keeping the throat from collapsing while you are sleeping. In most cases, the device is  smart  and can slowly self-adjusts if your throat collapses and keeps a record every day of how well you are treated-this information is available to you and your care team.  BPAP is bilevel positive airway pressure that keeps your throat open and also assists each breath with a pressure boost to maintain adequate breathing.  Special kinds of BPAP are used in patients who have inadequate breathing from lung or heart disease. In most cases, the device is  smart  and can slowly self-adjusts to assist breathing. Like CPAP, the device keeps a record of how well you are treated.  Your mask is your connection to the device. You get to choose what feels most comfortable and the staff will help to make sure if fits. Here: are some examples of the different masks that are available:       Key points to remember on your journey with sleep apnea:  Sleep study.  PAP devices often need to be adjusted during a sleep study to show that they are effective and adjusted right.  Good tips to remember: Try wearing just the mask during a quiet time during the day so your body adapts to wearing it. A humidifier is recommended for comfort in most cases to prevent drying of your nose and throat. Allergy medication from your provider may help you if you are having nasal congestion.  Getting settled-in. It takes more than one night for most of us to get used to wearing a mask. Try wearing just the mask  during a quiet time during the day so your body adapts to wearing it. A humidifier is recommended for comfort in most cases. Our team will work with you carefully on the first day and will be in contact within 4 days and again at 2 and 4 weeks for advice and remote device adjustments. Your therapy is evaluated by the device each day.   Use it every night. The more you are able to sleep naturally for 7-8 hours, the more likely you will have good sleep and to prevent health risks or symptoms from sleep apnea. Even if you use it 4 hours it helps. Occasionally all of us are unable to use a medical therapy, in sleep apnea, it is not dangerous to miss one night.   Communicate. Call our skilled team on the number provided on the first day if your visit for problems that make it difficult to wear the device. Over 2 out of 3 patients can learn to wear the device long-term with help from our team. Remember to call our team or your sleep providers if you are unable to wear the device as we may have other solutions for those who cannot adapt to mask CPAP therapy. It is recommended that you sleep your sleep provider within the first 3 months and yearly after that if you are not having problems.   Use it for your health. We encourage use of CPAP masks during daytime quiet periods to allow your face and brain to adapt to the sensation of CPAP so that it will be a more natural sensation to awaken to at night or during naps. This can be very useful during the first few weeks or months of adapting to CPAP though it does not help medically to wear CPAP during wakefulness and  should not be used as a strategy just to meet guidelines.  Take care of your equipment. Make sure you clean your mask and tubing using directions every day and that your filter and mask are replaced as recommended or if they are not working.     BESIDES CPAP, WHAT OTHER THERAPIES ARE THERE?    Positioning Device  Positioning devices are generally used when sleep  apnea is mild and only occurs on your back.This example shows a pillow that straps around the waist. It may be appropriate for those whose sleep study shows milder sleep apnea that occurs primarily when lying flat on one's back. Preliminary studies have shown benefit but effectiveness at home may need to be verified by a home sleep test. These devices are generally not covered by medical insurance.  Examples of devices that maintain sleeping on the back to prevent snoring and mild sleep apnea.    Belt type body positioner  http://[employer].Callystro/    Electronic reminder  http://nightshifttherapy.com/            Oral Appliance  What is oral appliance therapy?  An oral appliance device fits on your teeth at night like a retainer used after having braces. The device is made by a specialized dentist and requires several visits over 1-2 months before a manufactured device is made to fit your teeth and is adjusted to prevent your sleep apnea. Once an oral device is working properly, snoring should be improved. A home sleep test may be recommended at that time if to determine whether the sleep apnea is adequately treated.       Some things to remember:  -Oral devices are often, but not always, covered by your medical insurance. Be sure to check with your insurance provider.   -If you are referred for oral therapy, you will be given a list of specialized dentists to consider or you may choose to visit the Web site of the American Academy of Dental Sleep Medicine  -Oral devices are less likely to work if you have severe sleep apnea or are extremely overweight.     More detailed information  An oral appliance is a small acrylic device that fits over the upper and lower teeth  (similar to a retainer or a mouth guard). This device slightly moves jaw forward, which moves the base of the tongue forward, opens the airway, improves breathing for effective treat snoring and obstructive sleep apnea in perhaps 7 out of 10 people .  The  best working devices are custom-made by a dental device  after a mold is made of the teeth 1, 2, 3.  When is an oral appliance indicated?  Oral appliance therapy is recommended as a first-line treatment for patients with primary snoring, mild sleep apnea, and for patients with moderate sleep apnea who prefer appliance therapy to use of CPAP4, 5. Severity of sleep apnea is determined by sleep testing and is based on the number of respiratory events per hour of sleep.   How successful is oral appliance therapy?  The success rate of oral appliance therapy in patients with mild sleep apnea is 75-80% while in patients with moderate sleep apnea it is 50-70%. The chance of success in patients with severe sleep apnea is 40-50%. The research also shows that oral appliances have a beneficial effect on the cardiovascular health of ANGELINE patients at the same magnitude as CPAP therapy7.  Oral appliances should be a second-line treatment in cases of severe sleep apnea, but if not completely successful then a combination therapy utilizing CPAP plus oral appliance therapy may be effective. Oral appliances tend to be effective in a broad range of patients although studies show that the patients who have the highest success are females, younger patients, those with milder disease, and less severe obesity. 3, 6.   Finding a dentist that practices dental sleep medicine  Specific training is available through the American Academy of Dental Sleep Medicine for dentists interested in working in the field of sleep. To find a dentist who is educated in the field of sleep and the use of oral appliances, near you, visit the Web site of the American Academy of Dental Sleep Medicine.    References  1. Brandan et al. Objectively measured vs self-reported compliance during oral appliance therapy for sleep-disordered breathing. Chest 2013; 144(5): 3943-7413.  2. Delmar et al. Objective measurement of compliance during oral  appliance therapy for sleep-disordered breathing. Thorax 2013; 68(1): 91-96.  3. Escobar, et al. Mandibular advancement devices in 620 men and women with ANGELINE and snoring: tolerability and predictors of treatment success. Chest 2004; 125: 0999-9898.  4. Wanda et al. Oral appliances for snoring and ANGELINE: a review. Sleep 2006; 29: 244-262.  5. Maryse et al. Oral appliance treatment for ANGELINE: an update. J Clin Sleep Med 2014; 10(2): 215-227.  6. Alma et al. Predictors of OSAH treatment outcome. J Dent Res 2007; 86: 5579-1157.      Weight Loss:    Weight loss is a long-term strategy that may improve sleep apnea in some patients.    Weight management is a personal decision and the decision should be based on your interest and the potential benefits.  If you are interested in exploring weight loss strategies, the following discussion covers the impact on weight loss on sleep apnea and the approaches that may be successful.    Being overweight does not necessarily mean you will have health consequences.  Those who have BMI over 35 or over 27 with existing medical conditions carries greater risk.   Weight loss decreases severity of sleep apnea in most people with obesity. For those with mild obesity who have developed snoring with weight gain, even 15-30 pound weight loss can improve and occasionally eliminate sleep apnea.  Structured and life-long dietary and health habits are necessary to lose weight and keep healthier weight levels.     Though there may be significant health benefits from weight loss, long-term weight loss is very difficult to achieve- studies show success with dietary management in less than 10% of people. In addition, substantial weight loss may require years of dietary control and may be difficult if patients have severe obesity. In these cases, surgical management may be considered.  Finally, older individuals who have tolerated obesity without health complications may be less likely to  benefit from weight loss strategies.      [unfilled]    Surgery:    Surgery for obstructive sleep apnea is considered generally only when other therapies fail to work. Surgery may be discussed with you if you are having a difficult time tolerating CPAP and or when there is an abnormal structure that requires surgical correction.  Nose and throat surgeries often enlarge the airway to prevent collapse.  Most of these surgeries create pain for 1-2 weeks and up to half of the most common surgeries are not effective throughout life.  You should carefully discuss the benefits and drawbacks to surgery with your sleep provider and surgeon to determine if it is the best solution for you.   More information  Surgery for ANGELINE is directed at areas that are responsible for narrowing or complete obstruction of the airway during sleep.  There are a wide range of procedures available to enlarge and/or stabilize the airway to prevent blockage of breathing in the three major areas where it can occur: the palate, tongue, and nasal regions.  Successful surgical treatment depends on the accurate identification of the factors responsible for obstructive sleep apnea in each person.  A personalized approach is required because there is no single treatment that works well for everyone.  Because of anatomic variation, consultation with an examination by a sleep surgeon is a critical first step in determining what surgical options are best for each patient.  In some cases, examination during sedation may be recommended in order to guide the selection of procedures.  Patients will be counseled about risks and benefits as well as the typical recovery course after surgery. Surgery is typically not a cure for a person s ANGELINE.  However, surgery will often significantly improve one s ANGELINE severity (termed  success rate ).  Even in the absence of a cure, surgery will decrease the cardiovascular risk associated with OSA7; improve overall quality of  life8 (sleepiness, functionality, sleep quality, etc).      Palate Procedures:  Patients with ANGELINE often have narrowing of their airway in the region of their tonsils and uvula.  The goals of palate procedures are to widen the airway in this region as well as to help the tissues resist collapse.  Modern palate procedure techniques focus on tissue conservation and soft tissue rearrangement, rather than tissue removal.  Often the uvula is preserved in this procedure. Residual sleep apnea is common in patient after pharyngoplasty with an average reduction in sleep apnea events of 33%2.      Tongue Procedures:  ExamWhile patients are awake, the muscles that surround the throat are active and keep this region open for breathing. These muscles relax during sleep, allowing the tongue and other structures to collapse and block breathing.  There are several different tongue procedures available.  Selection of a tongue base procedure depends on characteristics seen on physical exam.  Generally, procedures are aimed at removing bulky tissues in this area or preventing the back of the tongue from falling back during sleep.  Success rates for tongue surgery range from 50-62%3.    Hypoglossal Nerve Stimulation:  Hypoglossal nerve stimulation has recently received approval from the [place] Food and Drug Administration for the treatment of obstructive sleep apnea.  This is based on research showing that the system was safe and effective in treating sleep apnea6.  Results showed that the median AHI score decreased 68%, from 29.3 to 9.0. This therapy uses an implant system that senses breathing patterns and delivers mild stimulation to airway muscles, which keeps the airway open during sleep.  The system consists of three fully implanted components: a small generator (similar in size to a pacemaker), a breathing sensor, and a stimulation lead.  Using a small handheld remote, a patient turns the therapy on before bed and off upon  awakening.    Candidates for this device must be greater than 18 years of age, have moderate to severe ANGELINE (AHI between 15-65), BMI less than 35, have tried CPAP/oral appliance for at least 8 weeks without success, and have appropriate upper airway anatomy (determined by a sleep endoscopy performed by Dr. Frederick Sebastian).    Hypoglossal Nerve Stimulation Pathway:    The sleep surgeon s office will work with the patient through the insurance prior-authorization process (including communications and appeals).    Nasal Procedures:  Nasal obstruction can interfere with nasal breathing during the day and night.  Studies have shown that relief of nasal obstruction can improve the ability of some patients to tolerate positive airway pressure therapy for obstructive sleep apnea1.  Treatment options include medications such as nasal saline, topical corticosteroid and antihistamine sprays, and oral medications such as antihistamines or decongestants. Non-surgical treatments can include external nasal dilators for selected patients. If these are not successful by themselves, surgery can improve the nasal airway either alone or in combination with these other options.      Combination Procedures:  Combination of surgical procedures and other treatments may be recommended, particularly if patients have more than one area of narrowing or persistent positional disease.  The success rate of combination surgery ranges from 66-80%2,3.    References  Divya MOLINA. The Role of the Nose in Snoring and Obstructive Sleep Apnoea: An Update.  Eur Arch Otorhinolaryngol. 2011; 268: 1365-73.   Bhavna SM; Ricky JA; Amira JR; Pallanch JF; Polina MB; Galdino SG; Mario PLASENCIA. Surgical modifications of the upper airway for obstructive sleep apnea in adults: a systematic review and meta-analysis. SLEEP 2010;33(10):5441-2571. Shelton SWAIN. Hypopharyngeal surgery in obstructive sleep apnea: an evidence-based medicine review.  Arch Otolaryngol Head Neck Surg.  2006 Feb;132(2):206-13.  Aryan YH1, Garfield Y, Rual EMORY. The efficacy of anatomically based multilevel surgery for obstructive sleep apnea. Otolaryngol Head Neck Surg. 2003 Oct;129(4):327-35.  Shelton SWAIN, Goldberg A. Hypopharyngeal Surgery in Obstructive Sleep Apnea: An Evidence-Based Medicine Review. Arch Otolaryngol Head Neck Surg. 2006 Feb;132(2):206-13.  Carlos CASAREZ et al. Upper-Airway Stimulation for Obstructive Sleep Apnea.  N Engl J Med. 2014 Jan 9;370(2):139-49.  Peelan Y et al. Increased Incidence of Cardiovascular Disease in Middle-aged Men with Obstructive Sleep Apnea. Am J Respir Crit Care Med; 2002 166: 159-165  Avalosjohn BURROWS et al. Studying Life Effects and Effectiveness of Palatopharyngoplasty (SLEEP) study: Subjective Outcomes of Isolated Uvulopalatopharyngoplasty. Otolaryngol Head Neck Surg. 2011; 144: 623-631.        WHAT IF I ONLY HAVE SNORING?    Mandibular advancement devices, lateral sleep positioning, long-term weight loss and treatment of nasal allergies have been shown to improve snoring.  Exercising tongue muscles with a game (https://apps.[employer]/us/gabi/soundly-reduce-snoring/vv5914012254) or stimulating the tongue during the day with a device (https://doi.org/10.3390/owh43994683) have improved snoring in some individuals.    Remember to Drive Safe... Drive Alive     Sleep health profoundly affects your health, mood, and your safety.  Thirty three percent of the population (one in three of us) is not getting enough sleep and many have a sleep disorder. Not getting enough sleep or having an untreated / undertreated sleep condition may make us sleepy without even knowing it. In fact, our driving could be dramatically impaired due to our sleep health. As your provider, here are some things I would like you to know about driving:     Here are some warning signs for impairment and dangerous drowsy driving:              -Having been awake more than 16 hours               -Looking tired                -Eyelid drooping              -Head nodding (it could be too late at this point)              -Driving for more than 30 minutes     Some things you could do to make the driving safer if you are experiencing some drowsiness:              -Stop driving and rest              -Call for transportation              -Make sure your sleep disorder is adequately treated     Some things that have been shown NOT to work when experiencing drowsiness while driving:              -Turning on the radio              -Opening windows              -Eating any  distracting  /  entertaining  foods (e.g., sunflower seeds, candy, or any other)              -Talking on the phone      Your decision may not only impact your life, but also the life of others. Please, remember to drive safe for yourself and all of us.

## 2023-05-19 NOTE — PROGRESS NOTES
Outpatient Sleep Medicine Consultation:      Name: Kobe Bravo III MRN# [id number]   Age: 47 year old YOB: 1975     Date of Consultation: May 19, 2023  Consultation is requested by: Sukumar Lucero PA-C  [address] [place] [address] Sukuamr Lucero  Primary care provider: Sukumar Lucero       Reason for Sleep Consult:     Kobe Bravo III is sent by Sukumar Lucero for a sleep consultation regarding ANGELINE.    Patient s Reason for visit  Kobe Bravo III main reason for visit:  Snoring  Patient states problem(s) started:  Last 2 years +  Kobe Bravo III's goals for this visit:  Options to assist         Assessment and Plan:     Summary Sleep Diagnoses:  1. ANGELINE (obstructive sleep apnea)  2. Dream enactment behavior  Comorbid Diagnoses:  3. Morbid obesity (H)  4. FARHAD (generalized anxiety disorder)    Patient presents to clinic today to reestablish care of his obstructive sleep apnea.  He was previously seen by Dr. Smith back in 2019.  Originally diagnosed via HST 1/30/2019 with AHI 10.9.  He is currently treating his sleep apnea with oral appliance therapy.  Patient feels that his symptoms have progressed since last sleep study and use of oral appliance is no longer effectively treating his symptoms, continues to snore despite using the appliance and some witnessed apneas per wife.  He is unsure if he has any room for further advancement.  He was previously trialed on CPAP before he got his oral appliance but only used for a few days before he deemed it intolerable.  Admits that his motivation was poor at the time to use it and feels that if he retried would be more motivated to use now.  He has gained 29 pounds since his last sleep study which is likely resulting in worsening of symptoms.  After discussion of options today we have agreed to place orders for repeat sleep study.  We will plan for in lab polysomnogram evaluation to update sleep apnea severity as  "well as evaluate REM motor tone.  He reports a recent episode of dream enactment where he woke up and he was punching his wife, does not recall what he was dreaming.  Discussed importance of keeping a safe sleep environment.  Of note he did recently start on Lexapro, this could be a medication side effect.  No episodes of dream enactment in the past.  We are unfortunately scheduling well into the summer months/early fall for sleep studies at this time.  In the meantime while we await for updating sleep study he will return to his dentist for possible further advancement of his current oral appliance.  Assuming sleep apnea is worse on new sleep study we will very likely plan to pursue CPAP therapy.  Plan to see him back about 2 weeks after study is completed however to discuss results in detail.  Educational materials provided in patient instructions.  - Comprehensive Sleep Study; Future         History of Present Illness:     Kobe Bravo III is a 47-year-old male with obesity, anxiety, ANGELINE who presents to clinic today to reestablish care.  Patient was previously seen in our clinic by Dr. Smith with his last visit May 2019.      Reviewed patient's prior home sleep study that was diagnostic of mild ANGELINE.  HST completed 1/30/2019 (224#, BMI 33.56) revealing AHI 10.9, supine AHI 11.9, nonsupine AHI 6.5, REINALDO 5.0, snore index 28.3, oxygen harvey 80%, 6 minutes with oxygen saturation below 88%, though there is note that some is due to motion artifact.  Initial presenting symptoms included loud snoring, witnessed apnea, waking up gasping for air, frequent awakening at night with some early awakenings, restless sleep, nonrestorative sleep, dry mouth and throat.    Following this positive HST patient started on autoCPAP [id number], set up on 4/8/2019, used for only a few nights then he switched to the dental appliance.  Admits that he was not overly motivated to use CPAP\" I probably did not give it long enough\".  Got " "mandibular advancement device from [facility].  He is on his second oral appliance and reports some benefit but he is unsure how effective it is.  He is still snoring through the guard and some witnessed apneas per wife.  He is unsure if he has any more room for advancement.  Snoring is bothersome to his wife and he can wake from his snoring as well.    Feels his symptoms are \"slightly worse\" than initial presentation.  Patient has gained 29 pounds since last sleep study.    SLEEP-WAKE SCHEDULE:     Work/School Days: Patient goes to school/work:   He yes  Usually gets into bed between 9-10:00 PM  Takes patient about 20-30 minutes to fall asleep  Has trouble falling asleep 4-5 nights per week  Wakes up in the middle of the night 0-1 times.  Will wake up 4-5 nights per week  Wakes up due to snorting self awake, anxiety  He has trouble falling back asleep 5 times a week.   Returning to sleep can vary, sometimes can fall back asleep quickly but typically he watches TV for 1-2 hours before he becomes sleepy and will drift off again.  Admits to rumination and anxiousness at this time  Patient is usually up at  6:00 AM  Uses alarm:   Yes    Weekends/Non-work Days/All Other Days:  Usually gets into bed at   10:00 PM  Takes patient about 30 minutes to fall asleep  Patient is usually up at  6-7:00 AM  Uses alarm:  Yes    Sleep Need  Patient estimates he gets 4 hours sleep on average   Patient thinks he needs about 6-7 hours sleep    Kobe Bravo III prefers to sleep in this position(s):   Side, stomach    Naps  Patient takes a purposeful nap 0 times a week   He feels better after a nap:  N/A  He dozes off unintentionally 0 days per week  Patient has had a driving accident or near-miss due to sleepiness/drowsiness:  No  He had a total [place] Sleepiness Scale of 4 (05/19/23 4919), with scores of 10 or higher indicating abnormal levels of sleepiness.    SLEEP DISRUPTIONS:    Breathing/Snoring  Patient snores: " "Yes  Other people complain about his snoring:  Yes  Patient has been told he stops breathing in his sleep: Yes  He has issues with the following:  Morning mouth dryness    Movement:  Patient gets pain, discomfort, with an urge to move:   No  Patient has been told he kicks his legs at night:   No     Behaviors in Sleep:  Dream enactment - 1 recent episode just a couple weeks ago, states \"I think it was a result of a nightmare or something I was punching my wife\".  No harm to her.  Does not recall what he was dreaming.  No history of any episodes in the past. Of note he was recently started on Lexapro    Denies sleepwalking, sleep talking, sleep eating, bruxism, recurring nightmares, night terrors, sleep paralysis, hypnagogic/hypnopompic hallucinations, cataplexy    CAFFEINE AND OTHER SUBSTANCES:    Patient consumes caffeinated beverages per day:   3 AM  Last caffeine use is usually:    List of any prescribed or over the counter stimulants that patient takes:  None  List of any prescribed or over the counter sleep medication patient takes:  None/hydroxyzine more for anxiety  Patient drinks alcohol to help them sleep:  No  Patient drinks alcohol near bedtime:  Yes can be but typically done by 6:00 PM     Family History:  Patient has a family member been diagnosed with a sleep disorder:  Both mother and father with ANGELINE history      SCALES:    EPWORTH SLEEPINESS SCALE         5/19/2023     9:26 AM    [place] Sleepiness Scale ( [place]  1990-1997[facility] [place]/English - Final version - 21 Nov 07 - [facility] [place].)   [place] Score (Sleep) 4         INSOMNIA SEVERITY INDEX (LYNN)          5/19/2023     9:26 AM   Insomnia Severity Index (LYNN)   Difficulty falling asleep 2   Difficulty staying asleep 3   Problems waking up too early 3   How SATISFIED/DISSATISFIED are you with your CURRENT sleep pattern? 3   How NOTICEABLE to others do you think your sleep problem is in terms of impairing the quality of your life? 3 "   How WORRIED/DISTRESSED are you about your current sleep problem? 3   To what extent do you consider your sleep problem to INTERFERE with your daily functioning (e.g. daytime fatigue, mood, ability to function at work/daily chores, concentration, memory, mood, etc.) CURRENTLY? 2   LYNN Total Score 19       Guidelines for Scoring/Interpretation:  Total score categories:  0-7 = No clinically significant insomnia   8-14 = Subthreshold insomnia   15-21 = Clinical insomnia (moderate severity)  22-28 = Clinical insomnia (severe)  Used via courtesy of www.[employer].va.gov with permission from Po Campos PhD., [facility]    GAD7        4/24/2023    12:03 PM   FARHAD-7    1. Feeling nervous, anxious, or on edge 1   2. Not being able to stop or control worrying 0   3. Worrying too much about different things 1   4. Trouble relaxing 1   5. Being so restless that it is hard to sit still 0   6. Becoming easily annoyed or irritable 0   7. Feeling afraid, as if something awful might happen 0   FARHAD-7 Total Score 3   If you checked any problems, how difficult have they made it for you to do your work, take care of things at home, or get along with other people? Somewhat difficult     PATIENT HEALTH QUESTIONNAIRE-9 (PHQ - 9)        4/24/2023    12:03 PM   PHQ-9 ([employer])   1.  Little interest or pleasure in doing things 1   2.  Feeling down, depressed, or hopeless 1   3.  Trouble falling or staying asleep, or sleeping too much 1   4.  Feeling tired or having little energy 0   5.  Poor appetite or overeating 0   6.  Feeling bad about yourself - or that you are a failure or have let yourself or your family down 1   7.  Trouble concentrating on things, such as reading the newspaper or watching television 0   8.  Moving or speaking so slowly that other people could have noticed. Or the opposite - being so fidgety or restless that you have been moving around a lot more than usual 0   9.  Thoughts that you would be better off dead,  or of hurting yourself in some way 0   PHQ-9 Total Score 4   6.  Feeling bad about yourself 1   7.  Trouble concentrating 0   8.  Moving slowly or restless 0   9.  Suicidal or self-harm thoughts 0   1.  Little interest or pleasure in doing things Several days   2.  Feeling down, depressed, or hopeless Several days   3.  Trouble falling or staying asleep, or sleeping too much Several days   4.  Feeling tired or having little energy Not at all   5.  Poor appetite or overeating Not at all   6.  Feeling bad about yourself Several days   7.  Trouble concentrating Not at all   8.  Moving slowly or restless Not at all   9.  Suicidal or self-harm thoughts Not at all   PHQ-9 via [employer] TOTAL SCORE-----> 4 (Minimal depression)   Difficulty at work, home, or with people Somewhat difficult       Developed by Ines Toure, Micheline Barry, Luis Rhodes and colleagues, with an educational lalita from [employer]. No permission required to reproduce, translate, display or distribute.        Allergies:    No Known Allergies    Medications:    Current Outpatient Medications   Medication Sig Dispense Refill     escitalopram (LEXAPRO) 10 MG tablet Take 1 tablet (10 mg) by mouth daily 90 tablet 0     hydrOXYzine (ATARAX) 25 MG tablet Take 1 tablet (25 mg) by mouth At Bedtime 30 tablet 1       Problem List:  Patient Active Problem List    Diagnosis Date Noted     Morbid obesity (H) 03/19/2021     Priority: Medium     ANGELINE (obstructive sleep apnea) 02/18/2019     Priority: Medium     Using dental appliance         CARDIOVASCULAR SCREENING; LDL GOAL LESS THAN 160 10/31/2010     Priority: Medium        Past Medical/Surgical History:  Past Medical History:   Diagnosis Date     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     Past Surgical History:   Procedure Laterality Date     ARTHROSCOPY KNEE WITH MEDIAL MENISCECTOMY  1/23/2012    Procedure:ARTHROSCOPY KNEE WITH MEDIAL MENISCECTOMY; Left Knee scope with Partial Medial  Menisectomy  ; Surgeon:ONOFRE PATRICIA; Location:[facility] OR     COLONOSCOPY Left 11/3/2022    Procedure: COLONOSCOPY;  Surgeon: Robel Davidson MD;  Location:  GI     ORTHOPEDIC SURGERY      marvin ACL repair     ORTHOPEDIC SURGERY      left foot fracture repair     SEPTOPLASTY       SOFT TISSUE SURGERY      multiple knee surgeries     wisdom teeth[         Social History:  Social History     Socioeconomic History     Marital status:      Spouse name: Not on file     Number of children: Not on file     Years of education: Not on file     Highest education level: Not on file   Occupational History     Not on file   Tobacco Use     Smoking status: Never     Smokeless tobacco: Former     Quit date: 2011   Vaping Use     Vaping status: Never Used   Substance and Sexual Activity     Alcohol use: Yes     Alcohol/week: 0.0 standard drinks of alcohol     Comment: on occ     Drug use: No     Sexual activity: Yes     Partners: Female     Birth control/protection: Male Surgical   Other Topics Concern     Parent/sibling w/ CABG, MI or angioplasty before 65F 55M? No   Social History Narrative     Not on file     Social Determinants of Health     Financial Resource Strain: Not on file   Food Insecurity: Not on file   Transportation Needs: Not on file   Physical Activity: Not on file   Stress: Not on file   Social Connections: Not on file   Intimate Partner Violence: Not on file   Housing Stability: Not on file       Family History:  Family History   Problem Relation Age of Onset     Asthma Mother      Heart Disease Mother         A-fib     Cerebrovascular Disease Mother      Depression Mother      Obesity Mother      Diabetes Father          complications of DM age 69     C.A.D. Father      Obesity Father      Alcohol/Drug Paternal Grandmother      Colon Cancer No family hx of        Review of Systems:  Positive for night sweats, difficulty breathing through nose, dry mouth in the morning, increased cough,  anxiety    Physical Examination:  Vitals: /83   Pulse 84   Wt 115 kg (253 lb 8 oz)   SpO2 95%   BMI 37.98 kg/m    BMI= Body mass index is 37.98 kg/m .  General appearance: Awake, alert, cooperative. Well groomed. Sitting comfortably in chair. In no apparent distress.  HEENT: Head: Normocephalic, atraumatic. Eyes: PERRL. Conjunctiva clear. Sclera normal. Nose: External appearance normal.    Neck: Neck Cir (cm): 43 cm (5/19/2023  9:00 AM)  Skin:No rashes or significant lesions.   Neurologic: Alert, oriented x3. No focal neurological deficit. Gait normal.   Psychiatric: Mood euthymic. Affect congruent with full range and intensity.         Data: All pertinent previous laboratory data reviewed     Recent Labs   Lab Test 08/25/22  0810 03/19/21  0900    139   POTASSIUM 4.5 4.0   CHLORIDE 109 108   CO2 26 28   ANIONGAP 5 3   GLC 99 100*   BUN 16 17   CR 0.90 1.02   RAJ 9.4 9.7       No results for input(s): WBC, RBC, HGB, HCT, MCV, MCH, MCHC, RDW, PLT in the last 89911 hours.    Recent Labs   Lab Test 08/25/22  0810   PROTTOTAL 7.1   ALBUMIN 3.7   BILITOTAL 0.6   ALKPHOS 63   AST 32   ALT 72*       TSH   Date Value   03/19/2021 2.40 mU/L   05/29/2008 1.50@ mcU/mL       No results found for: UAMP, UBARB, BENZODIAZEUR, UCANN, UCOC, OPIT, UPCP    No results found for: IRONSAT, [id number], MARGIE    No results found for: PH, PHARTERIAL, PO2, [id number], SAT, PCO2, HCO3, BASEEXCESS, JOSÉ, BEB    @LABRCNTIPR(phv:4,pco2v:4,po2v:4,hco3v:4,brayan:4,o2per:4)@    Echocardiology: No results found for this or any previous visit (from the past 4320 hour(s)).    Chest x-ray: No results found for this or any previous visit from the past 365 days.      Chest CT: No results found for this or any previous visit from the past 365 days.      PFT: Most Recent [facility] Pulmonary Function Testing    No results found for: 20001  No results found for: 20002  No results found for: 20003  No results found for: 20015  No results found for:  20016  No results found for: 20027  No results found for: 20028  No results found for: 20029  No results found for: 20079  No results found for: 20080  No results found for: 20081  No results found for: 20335  No results found for: 20105  No results found for: 20053  No results found for: 20054  No results found for: 20055      Debora Sanders PA-C 5/19/2023     [facility] [place]  [address] [address]     [facility]  [address] [address]    Chart documentation was completed, in part, with [employer] voice-recognition software. Even though reviewed, some grammatical, spelling, and word errors may remain.    65 minutes spent on day of encounter reviewing medical records, history and physical examination, review of previous testing and interpretation, documentation and further activities as noted above

## 2023-06-12 ENCOUNTER — VIRTUAL VISIT (OUTPATIENT)
Dept: FAMILY MEDICINE | Facility: CLINIC | Age: 48
End: 2023-06-12
Payer: COMMERCIAL

## 2023-06-12 DIAGNOSIS — J06.9 UPPER RESPIRATORY TRACT INFECTION, UNSPECIFIED TYPE: Primary | ICD-10-CM

## 2023-06-12 PROCEDURE — 99213 OFFICE O/P EST LOW 20 MIN: CPT | Mod: VID | Performed by: PHYSICIAN ASSISTANT

## 2023-06-12 RX ORDER — BENZONATATE 200 MG/1
200 CAPSULE ORAL 3 TIMES DAILY PRN
Qty: 21 CAPSULE | Refills: 0 | Status: SHIPPED | OUTPATIENT
Start: 2023-06-12 | End: 2023-09-11

## 2023-06-12 NOTE — PROGRESS NOTES
Rajeev is a 48 year old who is being evaluated via a billable video visit.      How would you like to obtain your AVS? MyChart  If the video visit is dropped, the invitation should be resent by: Text to cell phone: [phone]  Will anyone else be joining your video visit? No          Assessment & Plan     Upper respiratory tract infection, unspecified type  4-5 days hx of upper respiratory symptoms, mostly dry cough, sore throat. COVID test at home negative. No fevers, chills or shortness of breath. Will start below, continue otc and montior for changes. Presume bacterial at this point. Ok to send note later this week if not improving and then would consider empiric trial abx.   - benzonatate (TESSALON) 200 MG capsule; Take 1 capsule (200 mg) by mouth 3 times daily as needed for cough    KARTHIKEYAN Salguero [facility]    Subjective   Rajeev is a 48 year old, presenting for the following health issues:  No chief complaint on file.        6/12/2023    12:57 PM   Additional Questions   Roomed by EMMANUEL Lamar     History of Present Illness       Reason for visit:  Respiratory discomfort  Symptom onset:  1-3 days ago  Symptoms include:  Cough, sore throat  Symptom intensity:  Moderate  Symptom progression:  Staying the same  Had these symptoms before:  No    He eats 2-3 servings of fruits and vegetables daily.He consumes 0 sweetened beverage(s) daily.He exercises with enough effort to increase his heart rate 30 to 60 minutes per day.  He exercises with enough effort to increase his heart rate 4 days per week.   He is taking medications regularly.       Acute Illness  Acute illness concerns: Sore throat and cough   Onset/Duration: 3-4 days ago   Symptoms:  Fever: No  Chills/Sweats: YES- sweating   Headache (location?): No  Sinus Pressure: No  Conjunctivitis:  No  Ear Pain: no  Rhinorrhea: No  Congestion: No  Sore Throat: YES  Cough: YES-non-productive  Wheeze: YES- slightly   Decreased  Appetite: YES  Nausea: YES  Vomiting: No recent vomiting - last vomited Friday (3 times)   Diarrhea: YES  Dysuria/Freq.: No  Dysuria or Hematuria: No  Fatigue/Achiness: YES- fatigue   Sick/Strep Exposure: No  Therapies tried and outcome: Mucinex - helped with cough    Kobe Bravo III is a 48 year old male who presents today for rapid onset upper respiratory symptoms   He was out of town last week and began to experience some nausea, fatigue starting last week  Got home on Friday, and still battling constant cough, sore throat really bad  Past few days able to eat a little bit more  Trying to stay hydrated  Hard to sleep with excessive cough  There is some loss of voice  Had some mucinex; seemed to help  There is no shortness of breath; cough is mostly dry  There is little to no pressure in the sinuses and ears  Cough seems worse at night; affecting sleep  No fevers, chills  Also using ibuprofen; around 800mg twice daily  Took an at home COVID test (negative)      Review of Systems   Constitutional, HEENT, cardiovascular, pulmonary, gi and gu systems are negative, except as otherwise noted.      Objective           Vitals:  No vitals were obtained today due to virtual visit.    Physical Exam   GENERAL: Healthy, alert and no distress  EYES: Eyes grossly normal to inspection.  No discharge or erythema, or obvious scleral/conjunctival abnormalities.  RESP: No audible wheeze, cough, or visible cyanosis.  No visible retractions or increased work of breathing.    SKIN: Visible skin clear. No significant rash, abnormal pigmentation or lesions.  NEURO: Cranial nerves grossly intact.  Mentation and speech appropriate for age.  PSYCH: Mentation appears normal, affect normal/bright, judgement and insight intact, normal speech and appearance well-groomed.                Video-Visit Details    Type of service:  Video Visit     Originating Location (pt. Location): Home    Distant Location (provider location):  On-site  Platform  used for Video Visit: Ghazala

## 2023-06-19 ENCOUNTER — THERAPY VISIT (OUTPATIENT)
Dept: SLEEP MEDICINE | Facility: CLINIC | Age: 48
End: 2023-06-19
Payer: COMMERCIAL

## 2023-06-19 DIAGNOSIS — G47.33 OSA (OBSTRUCTIVE SLEEP APNEA): ICD-10-CM

## 2023-06-19 DIAGNOSIS — I49.3 FREQUENT PVCS: Primary | ICD-10-CM

## 2023-06-19 DIAGNOSIS — F41.1 GAD (GENERALIZED ANXIETY DISORDER): ICD-10-CM

## 2023-06-19 DIAGNOSIS — G47.52 DREAM ENACTMENT BEHAVIOR: ICD-10-CM

## 2023-06-19 DIAGNOSIS — E66.01 MORBID OBESITY (H): ICD-10-CM

## 2023-06-19 PROCEDURE — 95810 POLYSOM 6/> YRS 4/> PARAM: CPT | Performed by: INTERNAL MEDICINE

## 2023-06-19 ASSESSMENT — SLEEP AND FATIGUE QUESTIONNAIRES
HOW LIKELY ARE YOU TO NOD OFF OR FALL ASLEEP WHILE WATCHING TV: SLIGHT CHANCE OF DOZING
HOW LIKELY ARE YOU TO NOD OFF OR FALL ASLEEP WHILE SITTING QUIETLY AFTER LUNCH WITHOUT ALCOHOL: WOULD NEVER DOZE
HOW LIKELY ARE YOU TO NOD OFF OR FALL ASLEEP WHILE SITTING INACTIVE IN A PUBLIC PLACE: WOULD NEVER DOZE
HOW LIKELY ARE YOU TO NOD OFF OR FALL ASLEEP WHILE SITTING AND READING: SLIGHT CHANCE OF DOZING
HOW LIKELY ARE YOU TO NOD OFF OR FALL ASLEEP IN A CAR, WHILE STOPPED FOR A FEW MINUTES IN TRAFFIC: WOULD NEVER DOZE
HOW LIKELY ARE YOU TO NOD OFF OR FALL ASLEEP WHEN YOU ARE A PASSENGER IN A CAR FOR AN HOUR WITHOUT A BREAK: WOULD NEVER DOZE
HOW LIKELY ARE YOU TO NOD OFF OR FALL ASLEEP WHILE LYING DOWN TO REST IN THE AFTERNOON WHEN CIRCUMSTANCES PERMIT: SLIGHT CHANCE OF DOZING
HOW LIKELY ARE YOU TO NOD OFF OR FALL ASLEEP WHILE SITTING AND TALKING TO SOMEONE: WOULD NEVER DOZE

## 2023-06-23 LAB — SLPCOMP: NORMAL

## 2023-06-23 NOTE — PROCEDURES
" SLEEP STUDY INTERPRETATION  DIAGNOSTIC POLYSOMNOGRAPHY REPORT      Patient: EVERARDO VEGA  YOB: 1975  Study Date: 6/19/2023  MRN: [id number]  Referring Provider: KARTHIKEYAN Lucero  Ordering Provider: MD Diaz Dave    Indications for Polysomnography: The patient is a 48 year old Male who is 5' 8\" and weighs 255.0 lbs. His BMI is 39.1, [place] sleepiness scale 4 and neck circumference is 43 cm. A diagnostic polysomnogram was performed to evaluate for sleep apnea.    Polysomnogram Data: A full night polysomnogram recorded the standard physiologic parameters including EEG, EOG, EMG, ECG, nasal and oral airflow. Respiratory parameters of chest and abdominal movements were recorded with respiratory inductance plethysmography. Oxygen saturation was recorded by pulse oximetry. Hypopnea scoring rule used: 1B 4%.    Sleep Architecture: Fragmented sleep with reduced sleep efficiency.   The total recording time of the polysomnogram was 444.2 minutes. The total sleep time was 304.0 minutes. Sleep latency was increased at 25.0 minutes with the use of Hydroxyzine as a sleep aid. REM latency was 138.5 minutes. Arousal index was increased at 42.8 arousals per hour. Sleep efficiency was decreased at 68.4%. Wake after sleep onset was 115.0 minutes. The patient spent 24.0% of total sleep time in Stage N1, 46.7% in Stage N2, 13.3% in Stage N3, and 16.0% in REM. Time in REM supine was - minutes.    Respiration: Mild obstructive sleep apnea.     Events ? The polysomnogram revealed a presence of 9 obstructive, 2 central, and - mixed apneas resulting in an apnea index of 2.2 events per hour. There were 22 obstructive hypopneas and - central hypopneas resulting in an obstructive hypopnea index of 4.3 and central hypopnea index of - events per hour. The combined apnea/hypopnea index was 6.5 events per hour (central apnea/hypopnea index was 0.4 events per hour). The REM AHI was 1.2 events per hour. The supine AHI was " 47.3 events per hour. The RERA index was 1.2 events per hour.  The RDI was 7.7 events per hour.    Snoring - was reported as moderate.    Respiratory rate and pattern - was notable for normal respiratory rate and pattern.    Sustained Sleep Associated Hypoventilation - Transcutaneous carbon dioxide monitoring was not used.    Sleep Associated Hypoxemia - (Greater than 5 minutes O2 sat at or below 88%) was present. Baseline oxygen saturation was 90.6%. Lowest oxygen saturation was 86.0%. Time spent less than or equal to 88% was 32.3 minutes. Time spent less than or equal to 89% was 87.3 minutes.    Movement Activity: REM sleep without atonia was noted.     Periodic Limb Activity - There were 13 PLMs during the entire study. The PLM index was 2.6 movements per hour. The PLM Arousal Index was 1.2 per hour.    REM EMG Activity - Excessive transient muscle activity was present.    Nocturnal Behavior - Abnormal sleep related behaviors were not noted during/arising out of NREM / REM sleep.     Bruxism - None apparent.    Cardiac Summary: Sinus rhythm with frequent PVCs occurring in trigeminy pattern.   The average pulse rate was 60.4 bpm. The minimum pulse rate was 34.0 bpm while the maximum pulse rate was 103.0 bpm.  Arrhythmias were noted.          Assessment:     This sleep study shows mild degree of obstructive sleep apnea. Of note, there was minimal supine sleep during this test. If patient's normal sleep pattern includes substantial proportion of supine sleep, there could be underestimation of sleep apnea.      EKG showed frequent PVCs occurring in a trigeminy pattern and with ventricular couplets.     REM sleep without atonia was noted. If there is a clinical history of dream enactment behavior, consider presence of REM sleep behavioral disorder. REM sleep without atonia can be an incidental finding with use of serotonergic antidepressants.      Recommendations:    If treatment of mild obstructive sleep apnea is  clinically indicated, consider following therapy options.    Patient may be a candidate for dental appliance through referral to Sleep Dentistry for the treatment of obstructive sleep apnea.    If there is excessive daytime sleepiness or other qualifying medical comorbidity, treatment could be empirically initiated with Auto?titrating PAP therapy with a range of 5 to 15 cmH2O. Recommend clinical follow up with sleep management team.    Suggest optimizing sleep schedule and avoiding sleep deprivation.    Weight management (if BMI > 30).    Consider Cardiology consultation for frequent PVCs.     Diagnostic Codes:   Obstructive Sleep Apnea G47.33  Repetitive Intrusions Into Sleep F51.8    6/19/2023 [place] [facility] Sleep Study (255.0 lbs) - AHI 6.5, RDI 7.7, Supine AHI 47.3, REM AHI 1.2, Low O2 86.0%, Time Spent ?88% 32.3 minutes / Time Spent ?89% 87.3 minutes.     _____________________________________   Electronically Signed By: Diaz Dave MD 06/22/2023

## 2023-07-26 ENCOUNTER — PATIENT OUTREACH (OUTPATIENT)
Dept: CARE COORDINATION | Facility: CLINIC | Age: 48
End: 2023-07-26
Payer: COMMERCIAL

## 2023-07-31 ENCOUNTER — VIRTUAL VISIT (OUTPATIENT)
Dept: FAMILY MEDICINE | Facility: CLINIC | Age: 48
End: 2023-07-31
Payer: COMMERCIAL

## 2023-07-31 DIAGNOSIS — Z09 HOSPITAL DISCHARGE FOLLOW-UP: Primary | ICD-10-CM

## 2023-07-31 DIAGNOSIS — R45.851 SUICIDAL IDEATION: ICD-10-CM

## 2023-07-31 DIAGNOSIS — R74.01 TRANSAMINITIS: ICD-10-CM

## 2023-07-31 DIAGNOSIS — F41.9 ANXIETY: ICD-10-CM

## 2023-07-31 DIAGNOSIS — E66.01 MORBID OBESITY (H): ICD-10-CM

## 2023-07-31 DIAGNOSIS — F10.220 ACUTE ALCOHOLIC INTOXICATION IN ALCOHOLISM WITHOUT COMPLICATION (H): ICD-10-CM

## 2023-07-31 DIAGNOSIS — F43.21 GRIEF: ICD-10-CM

## 2023-07-31 PROCEDURE — 99213 OFFICE O/P EST LOW 20 MIN: CPT | Mod: 95 | Performed by: PHYSICIAN ASSISTANT

## 2023-07-31 RX ORDER — GABAPENTIN 300 MG/1
600 CAPSULE ORAL AT BEDTIME
Qty: 90 CAPSULE | Refills: 0 | COMMUNITY
Start: 2023-07-31 | End: 2023-09-11

## 2023-07-31 NOTE — PROGRESS NOTES
Rajeev is a 48 year old who is being evaluated via a billable video visit.      How would you like to obtain your AVS? MyChart  If the video visit is dropped, the invitation should be resent by: Text to cell phone: [phone]  Will anyone else be joining your video visit? No          Assessment & Plan     Hospital discharge follow-up  Suicidal ideation  Anxiety  Grief  Acute alcoholic intoxication in alcoholism without complication (H)  Overall Rajeev is feeling better thankfully. He has a good network of support, in therapy as well as AA too. Sober now since his hospital visit. Feels ok without naltrexone but we discussed he can call anytime if wanting to start. Can refill meds as needed with me. Will follow up at his annual later this fall    Transaminitis  We are trending these; he has been sober 16 days  - Hepatic panel (Albumin, ALT, AST, Bili, Alk Phos, TP); Future  - INR; Future  - CBC with platelets; Future  - PRIMARY CARE FOLLOW-UP SCHEDULING; Future    Morbid obesity (H)  Already losing weight now that he has stopped drinking. This will definitively help fatty liver       MED REC REQUIRED  Post Medication Reconciliation Status:     MEDICATIONS:  Continue current medications without change    Sukumar Lucero PA-C  [facility] [place]    Subjective   Rajeev is a 48 year old, presenting for the following health issues:  No chief complaint on file.        6/12/2023    12:57 PM   Additional Questions   Roomed by EMMANUEL Lamar       [facility]       Hospital Follow-up Visit:    Hospital/Nursing Home/IP Rehab Facility:  [facility]  Date of Admission: 7/16/2023  Date of Discharge: 7/20/2023  Reason(s) for Admission: Suicidal ideation (Primary Dx);   Acute alcoholic intoxication with complication (HRC);   At high risk for suicide;   Grief (HRC);   Anxiety (HRC);   Hangover with complication (HRC);   Transaminitis     Was your hospitalization related to COVID-19? No   Problems taking medications  "regularly:  None  Medication changes since discharge: see avs  Problems adhering to non-medication therapy:  None    Kobe Bravo III is a 48 year old male who presents today for VIRTUAL hospital follow up   He had a significant event with SI and etohism  Physically he is feeling well; \"almost brand new\"  Sleeping well - with/with out hydroxyzine  Starting to work out a bit more  He has not seen any medical follow ups   -did go to out patient therapy   -Has sleep specialty follow up and cardiology follow up    -he has not followed up with hepatology         Summary of hospitalization:  CareEverywhere information obtained and reviewed  Diagnostic Tests/Treatments reviewed.  Follow up needed: follow LFT/INR/Platelet  Other Healthcare Providers Involved in Patient s Care:         Specialist appointment - Behavioral/addiction/hepatology  Update since discharge: improved.         Plan of care communicated with patient                   Review of Systems   Constitutional, HEENT, cardiovascular, pulmonary, gi and gu systems are negative, except as otherwise noted.      Objective           Vitals:  No vitals were obtained today due to virtual visit.    Physical Exam   GENERAL: Healthy, alert and no distress  EYES: Eyes grossly normal to inspection.  No discharge or erythema, or obvious scleral/conjunctival abnormalities.  RESP: No audible wheeze, cough, or visible cyanosis.  No visible retractions or increased work of breathing.    SKIN: Visible skin clear. No significant rash, abnormal pigmentation or lesions.  NEURO: Cranial nerves grossly intact.  Mentation and speech appropriate for age.  PSYCH: Mentation appears normal, affect normal/bright, judgement and insight intact, normal speech and appearance well-groomed.              Video-Visit Details    Type of service:  Video Visit     Originating Location (pt. Location): Home    Distant Location (provider location):  On-site  Platform used for Video Visit: " Doximity

## 2023-08-03 ENCOUNTER — LAB (OUTPATIENT)
Dept: LAB | Facility: CLINIC | Age: 48
End: 2023-08-03
Payer: COMMERCIAL

## 2023-08-03 DIAGNOSIS — R74.01 TRANSAMINITIS: ICD-10-CM

## 2023-08-03 LAB
ALBUMIN SERPL BCG-MCNC: 4.5 G/DL (ref 3.5–5.2)
ALP SERPL-CCNC: 61 U/L (ref 40–129)
ALT SERPL W P-5'-P-CCNC: 70 U/L (ref 0–70)
AST SERPL W P-5'-P-CCNC: 37 U/L (ref 0–45)
BILIRUB DIRECT SERPL-MCNC: <0.2 MG/DL (ref 0–0.3)
BILIRUB SERPL-MCNC: 0.3 MG/DL
ERYTHROCYTE [DISTWIDTH] IN BLOOD BY AUTOMATED COUNT: 12.1 % (ref 10–15)
HCT VFR BLD AUTO: 44 % (ref 40–53)
HGB BLD-MCNC: 15.1 G/DL (ref 13.3–17.7)
INR PPP: 0.97 (ref 0.85–1.15)
MCH RBC QN AUTO: 32.4 PG (ref 26.5–33)
MCHC RBC AUTO-ENTMCNC: 34.3 G/DL (ref 31.5–36.5)
MCV RBC AUTO: 94 FL (ref 78–100)
PLATELET # BLD AUTO: 329 10E3/UL (ref 150–450)
PROT SERPL-MCNC: 7.1 G/DL (ref 6.4–8.3)
RBC # BLD AUTO: 4.66 10E6/UL (ref 4.4–5.9)
WBC # BLD AUTO: 5.3 10E3/UL (ref 4–11)

## 2023-08-03 PROCEDURE — 85610 PROTHROMBIN TIME: CPT

## 2023-08-03 PROCEDURE — 36415 COLL VENOUS BLD VENIPUNCTURE: CPT

## 2023-08-03 PROCEDURE — 80076 HEPATIC FUNCTION PANEL: CPT

## 2023-08-03 PROCEDURE — 85027 COMPLETE CBC AUTOMATED: CPT

## 2023-09-10 ASSESSMENT — ENCOUNTER SYMPTOMS
SHORTNESS OF BREATH: 0
NERVOUS/ANXIOUS: 0
NAUSEA: 0
ARTHRALGIAS: 0
FREQUENCY: 0
HEADACHES: 0
COUGH: 0
ABDOMINAL PAIN: 0
DIZZINESS: 0
HEMATURIA: 0
WEAKNESS: 0
SORE THROAT: 0
MYALGIAS: 0
CONSTIPATION: 0
DIARRHEA: 0
CHILLS: 0
PARESTHESIAS: 0
JOINT SWELLING: 0
DYSURIA: 0
EYE PAIN: 0
PALPITATIONS: 0
HEMATOCHEZIA: 0
HEARTBURN: 0
FEVER: 0

## 2023-09-11 ENCOUNTER — OFFICE VISIT (OUTPATIENT)
Dept: FAMILY MEDICINE | Facility: CLINIC | Age: 48
End: 2023-09-11
Attending: PHYSICIAN ASSISTANT
Payer: COMMERCIAL

## 2023-09-11 VITALS
SYSTOLIC BLOOD PRESSURE: 128 MMHG | HEIGHT: 69 IN | HEART RATE: 86 BPM | RESPIRATION RATE: 23 BRPM | TEMPERATURE: 98.1 F | BODY MASS INDEX: 36.02 KG/M2 | OXYGEN SATURATION: 95 % | DIASTOLIC BLOOD PRESSURE: 84 MMHG | WEIGHT: 243.2 LBS

## 2023-09-11 DIAGNOSIS — Z00.00 ROUTINE GENERAL MEDICAL EXAMINATION AT A HEALTH CARE FACILITY: Primary | ICD-10-CM

## 2023-09-11 DIAGNOSIS — F10.11 HISTORY OF ETOH ABUSE: ICD-10-CM

## 2023-09-11 DIAGNOSIS — Z13.6 CARDIOVASCULAR SCREENING; LDL GOAL LESS THAN 160: ICD-10-CM

## 2023-09-11 DIAGNOSIS — F41.1 GAD (GENERALIZED ANXIETY DISORDER): ICD-10-CM

## 2023-09-11 DIAGNOSIS — K76.0 HEPATIC STEATOSIS: ICD-10-CM

## 2023-09-11 DIAGNOSIS — E66.01 MORBID OBESITY (H): ICD-10-CM

## 2023-09-11 LAB
CHOLEST SERPL-MCNC: 189 MG/DL
HDLC SERPL-MCNC: 49 MG/DL
LDLC SERPL CALC-MCNC: 121 MG/DL
NONHDLC SERPL-MCNC: 140 MG/DL
TRIGL SERPL-MCNC: 93 MG/DL

## 2023-09-11 PROCEDURE — 90746 HEPB VACCINE 3 DOSE ADULT IM: CPT | Performed by: PHYSICIAN ASSISTANT

## 2023-09-11 PROCEDURE — 36415 COLL VENOUS BLD VENIPUNCTURE: CPT | Performed by: PHYSICIAN ASSISTANT

## 2023-09-11 PROCEDURE — 99396 PREV VISIT EST AGE 40-64: CPT | Mod: 25 | Performed by: PHYSICIAN ASSISTANT

## 2023-09-11 PROCEDURE — 90677 PCV20 VACCINE IM: CPT | Performed by: PHYSICIAN ASSISTANT

## 2023-09-11 PROCEDURE — 90471 IMMUNIZATION ADMIN: CPT | Performed by: PHYSICIAN ASSISTANT

## 2023-09-11 PROCEDURE — 80061 LIPID PANEL: CPT | Performed by: PHYSICIAN ASSISTANT

## 2023-09-11 PROCEDURE — 90472 IMMUNIZATION ADMIN EACH ADD: CPT | Performed by: PHYSICIAN ASSISTANT

## 2023-09-11 RX ORDER — ESCITALOPRAM OXALATE 10 MG/1
10 TABLET ORAL DAILY
Qty: 90 TABLET | Refills: 1 | Status: SHIPPED | OUTPATIENT
Start: 2023-09-11 | End: 2024-03-11

## 2023-09-11 ASSESSMENT — ENCOUNTER SYMPTOMS
HEADACHES: 0
COUGH: 0
HEMATOCHEZIA: 0
FREQUENCY: 0
CHILLS: 0
NAUSEA: 0
SORE THROAT: 0
MYALGIAS: 0
PALPITATIONS: 0
PARESTHESIAS: 0
EYE PAIN: 0
DYSURIA: 0
CONSTIPATION: 0
SHORTNESS OF BREATH: 0
WEAKNESS: 0
JOINT SWELLING: 0
DIZZINESS: 0
DIARRHEA: 0
FEVER: 0
ARTHRALGIAS: 0
ABDOMINAL PAIN: 0
HEMATURIA: 0
HEARTBURN: 0
NERVOUS/ANXIOUS: 0

## 2023-09-11 ASSESSMENT — PAIN SCALES - GENERAL: PAINLEVEL: MODERATE PAIN (5)

## 2023-09-11 NOTE — PROGRESS NOTES
SUBJECTIVE:   CC: Rajeev is an 48 year old who presents for preventative health visit.       9/11/2023     8:25 AM   Additional Questions   Roomed by Mary ELISE   Accompanied by Self         9/11/2023     8:25 AM   Patient Reported Additional Medications   Patient reports taking the following new medications None       Healthy Habits:     Getting at least 3 servings of Calcium per day:  Yes    Bi-annual eye exam:  Yes    Dental care twice a year:  Yes    Sleep apnea or symptoms of sleep apnea:  Sleep apnea    Diet:  Regular (no restrictions)    Frequency of exercise:  4-5 days/week    Duration of exercise:  45-60 minutes    Taking medications regularly:  Yes    Medication side effects:  Not applicable    Additional concerns today:  No    Kobe KHANH Lawrence III is a 48 year old male who presents today for annual wellness  Continues with therapy through [facility] in [place]  Will attend  on some Sundays    Swimming a few days a week; some weight training as well  Body holding up quite well    Liver follow up later this month;  Sleep visit later this month as well   -has ANGELINE dx (mild) but did not tolerated the CPAP    FIB-4 Calculation: 0.62 at 8/3/2023  9:18 AM  Calculated from:  SGOT/AST: 37 U/L at 8/3/2023  9:18 AM  SGPT/ALT: 70 U/L at 8/3/2023  9:18 AM  Platelets: 329 10e3/uL at 8/3/2023  9:18 AM  Age: 48 years      Social History     Tobacco Use    Smoking status: Never    Smokeless tobacco: Former     Quit date: 6/11/2011   Substance Use Topics    Alcohol use: Yes     Comment: on occ             9/10/2023     6:22 PM   Alcohol Use   Prescreen: >3 drinks/day or >7 drinks/week? No       Last PSA: No results found for: PSA    Reviewed orders with patient. Reviewed health maintenance and updated orders accordingly - Yes  Lab work is in process  Labs reviewed in EPIC    Reviewed and updated as needed this visit by clinical staff   Tobacco  Allergies  Meds              Reviewed and updated as needed this visit by  "Provider                     Review of Systems   Constitutional:  Negative for chills and fever.   HENT:  Negative for congestion, ear pain, hearing loss and sore throat.    Eyes:  Negative for pain and visual disturbance.   Respiratory:  Negative for cough and shortness of breath.    Cardiovascular:  Negative for chest pain, palpitations and peripheral edema.   Gastrointestinal:  Negative for abdominal pain, constipation, diarrhea, heartburn, hematochezia and nausea.   Genitourinary:  Negative for dysuria, frequency, genital sores, hematuria, impotence, penile discharge and urgency.   Musculoskeletal:  Negative for arthralgias, joint swelling and myalgias.   Neurological:  Negative for dizziness, weakness, headaches and paresthesias.   Psychiatric/Behavioral:  Negative for mood changes. The patient is not nervous/anxious.          OBJECTIVE:   /84 (BP Location: Right arm, Patient Position: Sitting, Cuff Size: Adult Large)   Pulse 86   Temp 98.1  F (36.7  C) (Oral)   Resp 23   Ht 1.74 m (5' 8.5\")   Wt 110.3 kg (243 lb 3.2 oz)   SpO2 95%   BMI 36.44 kg/m      Physical Exam  GENERAL: healthy, alert and no distress  EYES: Eyes grossly normal to inspection, PERRL and conjunctivae and sclerae normal  HENT: ear canals and TM's normal, nose and mouth without ulcers or lesions  NECK: no adenopathy, no asymmetry, masses, or scars and thyroid normal to palpation  RESP: lungs clear to auscultation - no rales, rhonchi or wheezes  CV: regular rate and rhythm, normal S1 S2, no S3 or S4, no murmur, click or rub, no peripheral edema and peripheral pulses strong  ABDOMEN: soft, nontender, bowel sounds normal; small umbilical hernia  MS: no gross musculoskeletal defects noted, no edema  SKIN: no suspicious lesions or rashes  NEURO: Normal strength and tone, mentation intact and speech normal  PSYCH: mentation appears normal, affect normal/bright    Diagnostic Test Results:  Labs reviewed in Epic    ASSESSMENT/PLAN:   1. " Routine general medical examination at a health care facility  Reviewed personal and family history. Reviewed age appropriate screenings. Recommended any needed vaccinations.  - HEPATITIS B VACCINE ADULT 3 DOSE IM (ENGERIX-B/RECOMBIVAX HB)  - Pneumococcal 20 Valent Conjugate (Prevnar 20)    2. FARHAD (generalized anxiety disorder)  5. History of ETOH abuse  Overall Rajeev is doingn really well since last seen. Continues with sobriety, meetings/therapy 3x weekly. Not using gabapentin or hydroxyzine  - escitalopram (LEXAPRO) 10 MG tablet; Take 1 tablet (10 mg) by mouth daily  Dispense: 90 tablet; Refill: 1    3. Morbid obesity (H)  He is losing weight with increased exercise/diet. This will continue to help with the steatosis  - Pneumococcal 20 Valent Conjugate (Prevnar 20)    4. CARDIOVASCULAR SCREENING; LDL GOAL LESS THAN 160  - Lipid panel reflex to direct LDL Fasting; Future  - Lipid panel reflex to direct LDL Fasting    6. Hepatic steatosis   Losing weight. Sober since incident over the summer. FIB4 score well below 1        COUNSELING:   Reviewed preventive health counseling, as reflected in patient instructions        He reports that he has never smoked. He quit smokeless tobacco use about 12 years ago.            KARTHIKEYAN Salguero [facility]

## 2023-09-18 ENCOUNTER — LAB (OUTPATIENT)
Dept: LAB | Facility: CLINIC | Age: 48
End: 2023-09-18
Payer: COMMERCIAL

## 2023-09-18 ENCOUNTER — OFFICE VISIT (OUTPATIENT)
Dept: CARDIOLOGY | Facility: CLINIC | Age: 48
End: 2023-09-18
Attending: INTERNAL MEDICINE
Payer: COMMERCIAL

## 2023-09-18 VITALS
HEIGHT: 69 IN | OXYGEN SATURATION: 95 % | HEART RATE: 76 BPM | WEIGHT: 250 LBS | BODY MASS INDEX: 37.03 KG/M2 | DIASTOLIC BLOOD PRESSURE: 82 MMHG | SYSTOLIC BLOOD PRESSURE: 134 MMHG

## 2023-09-18 DIAGNOSIS — I49.3 FREQUENT PVCS: ICD-10-CM

## 2023-09-18 DIAGNOSIS — G47.33 OSA (OBSTRUCTIVE SLEEP APNEA): Primary | ICD-10-CM

## 2023-09-18 DIAGNOSIS — G47.33 OSA (OBSTRUCTIVE SLEEP APNEA): ICD-10-CM

## 2023-09-18 LAB
ANION GAP SERPL CALCULATED.3IONS-SCNC: 13 MMOL/L (ref 7–15)
BUN SERPL-MCNC: 19.7 MG/DL (ref 6–20)
CALCIUM SERPL-MCNC: 9.9 MG/DL (ref 8.6–10)
CHLORIDE SERPL-SCNC: 103 MMOL/L (ref 98–107)
CREAT SERPL-MCNC: 1.07 MG/DL (ref 0.67–1.17)
DEPRECATED HCO3 PLAS-SCNC: 23 MMOL/L (ref 22–29)
EGFRCR SERPLBLD CKD-EPI 2021: 87 ML/MIN/1.73M2
GLUCOSE SERPL-MCNC: 99 MG/DL (ref 70–99)
MAGNESIUM SERPL-MCNC: 1.9 MG/DL (ref 1.7–2.3)
POTASSIUM SERPL-SCNC: 3.9 MMOL/L (ref 3.4–5.3)
SODIUM SERPL-SCNC: 139 MMOL/L (ref 136–145)
TSH SERPL DL<=0.005 MIU/L-ACNC: 2.16 UIU/ML (ref 0.3–4.2)

## 2023-09-18 PROCEDURE — 84443 ASSAY THYROID STIM HORMONE: CPT | Performed by: INTERNAL MEDICINE

## 2023-09-18 PROCEDURE — 99204 OFFICE O/P NEW MOD 45 MIN: CPT | Performed by: INTERNAL MEDICINE

## 2023-09-18 PROCEDURE — 83735 ASSAY OF MAGNESIUM: CPT | Performed by: INTERNAL MEDICINE

## 2023-09-18 PROCEDURE — 80048 BASIC METABOLIC PNL TOTAL CA: CPT | Performed by: INTERNAL MEDICINE

## 2023-09-18 PROCEDURE — 36415 COLL VENOUS BLD VENIPUNCTURE: CPT | Performed by: INTERNAL MEDICINE

## 2023-09-18 PROCEDURE — 93000 ELECTROCARDIOGRAM COMPLETE: CPT | Performed by: INTERNAL MEDICINE

## 2023-09-18 NOTE — LETTER
9/18/2023    Sukumar Lucero PA-C  [address] [facility] [address]    RE: Kobe Bravo SHAWN       Dear Colleague,     I had the pleasure of seeing Kobe Bravo III in the [facility].  HPI and Plan:       Indication for cardiac consultation: Frequent PVCs with trigeminy noted on sleep study.    It is my pleasure to see your patient Kobe Bravo in consultation.  He is a very pleasant 48-year-old man who I have seen frequently in the past as I took care of both his mother and his father.  He is mother recently passed away which I was very sorry to hear.  This patient was noted during his sleep study to have relatively frequent PVCs and also PVCs in a trigeminal pattern.  The patient does not feel the PVCs and he does not have any palpitations nor tachycardia.  He has no symptoms of syncope nor near syncope.  He has no chest discomfort.  He has not been complaining of orthopnea PND or ankle edema.  A twelve-lead electrocardiogram which was performed today was normal.  He has not had a recent basic metabolic profile that I can see.  He is mother had a history of aortic stenosis for which she had an aortic valve replacement and his father had coronary artery disease.  He recently had significant issues with problematic alcohol intake and he also was voluntarily admitted to hospital for severe depression with planned suicide which fortunately did not take place as he sought help from the police.  He has been an extremely dutiful son and I have seen him for many years when he brought his mother in to the clinic with her cardiac condition.    Impression:  1.  Frequent PVCs on sleep study.  Obstructive sleep apnea alone can cause a plethora of arrhythmias of which PVCs are one of them.  However we do need to rule out ischemia and structural heart disease as well as more common causes of PVCs such as hypokalemia, hypomagnesemia and hyperparathyroidism.  2.  Recent history of depression and  alcohol abuse.  Withdrawal from alcohol and depression of course can also cause PVCs due to the significantly increased stress that both of these conditions can cause.    Plan:  1.  We will obtain an echocardiogram to see if the heart is structurally normal.  2.  I will have the patient wear a 24-hour Holter monitor to see the frequency of PVCs and also determine if any other arrhythmias are present.  3.  I will check a basic metabolic profile, TSH and magnesium.  4.  I would have the patient perform a stress EKG to determine if there is any evidence of ischemia and also to see if there is any inducible arrhythmias.  I will have the patient follow-up with the results of all of these tests.  5.  The patient is due to follow-up in the next week or 2 with the sleep lab which is the earliest that he could get to see them.  Treatment of obstructive sleep apnea in itself may significantly reduce the incidence of PVCs if this of course was the cause.    It has been my pleasure to be involved in the care of this extremely nice man and as always has been told to contact me with any questions or any concerns.    Aubrey Mike MD, FACC, FRCPI    Orders Placed This Encounter   Procedures    Basic metabolic panel    TSH with free T4 reflex    Magnesium    Follow-Up with Cardiology    EKG 12-lead complete w/read - Clinics (performed today)    Exercise Stress Test (Stress ECG)    Holter Monitor 24 hour Adult Pediatric    Echocardiogram Complete       No orders of the defined types were placed in this encounter.      There are no discontinued medications.      Encounter Diagnoses   Name Primary?    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       PAST SURGICAL HISTORY:  Past Surgical History:   Procedure Laterality Date    ARTHROSCOPY KNEE WITH MEDIAL MENISCECTOMY  2012    Procedure:ARTHROSCOPY KNEE WITH MEDIAL MENISCECTOMY; Left Knee scope with Partial Medial Menisectomy  ; Surgeon:ONOFRE PATRICIA; Location:[facility] OR    COLONOSCOPY Left 11/3/2022    Procedure: COLONOSCOPY;  Surgeon: Robel Davidson MD;  Location:  GI    ORTHOPEDIC SURGERY      marvin ACL repair    ORTHOPEDIC SURGERY      left foot fracture repair    SEPTOPLASTY      SOFT TISSUE SURGERY      multiple knee surgeries    wisdom teeth[         FAMILY HISTORY:  Family History   Problem Relation Age of Onset    Asthma Mother     Heart Disease Mother         A-fib    Cerebrovascular Disease Mother     Depression Mother     Obesity Mother     Sleep Apnea Mother     Sleep Apnea Father     Diabetes Father          complications of DM age 69    C.A.D. Father     Obesity Father     Alcohol/Drug Paternal Grandmother     Colon Cancer No family hx of        SOCIAL HISTORY:  Social History     Socioeconomic History    Marital status:      Spouse name: None    Number of children: None    Years of education: None    Highest education level: None   Tobacco Use    Smoking status: Never    Smokeless tobacco: Current     Types: Chew     Last attempt to quit: 2011   Vaping Use    Vaping Use: Never used   Substance and Sexual Activity    Alcohol use: Not Currently    Drug use: No    Sexual activity: Yes     Partners: Female     Birth control/protection: Male Surgical   Other Topics Concern    Parent/sibling w/ CABG, MI or angioplasty before 65F 55M? No       Review of Systems:  Skin:          Eyes:         ENT:         Respiratory:  Positive for sleep apnea     Cardiovascular:  Negative      Gastroenterology:        Genitourinary:         Musculoskeletal:         Neurologic:         Psychiatric:         Heme/Lymph/Imm:         Endocrine:      "      Physical Exam:  Vitals: /82 (BP Location: Right arm, Patient Position: Sitting, Cuff Size: Adult Large)   Pulse 76   Ht 1.74 m (5' 8.5\")   Wt 113.4 kg (250 lb)   SpO2 95%   BMI 37.46 kg/m      Constitutional:  cooperative, alert and oriented, well developed, well nourished, in no acute distress        Skin:  warm and dry to the touch, no apparent skin lesions or masses noted          Head:  no masses or lesions        Eyes:  pupils equal and round        Lymph:      ENT:  no pallor or cyanosis        Neck:  carotid pulses are full and equal bilaterally, JVP normal, no carotid bruit        Respiratory:  clear to auscultation;normal symmetry         Cardiac: regular rhythm;normal S1 and S2;no murmurs, gallops or rubs detected                pulses full and equal                                        GI:  not assessed this visit        Extremities and Muscular Skeletal:  no deformities, clubbing, cyanosis, erythema observed;no edema              Neurological:  no gross motor deficits;affect appropriate        Psych:  Alert and Oriented x 3        CC  Diaz Dave MD  [address] [facility] [place] [address] [address]        Thank you for allowing me to participate in the care of your patient.      Sincerely,     Aubrey Wick MD, MD     [facility]  "

## 2023-09-18 NOTE — PROGRESS NOTES
HPI and Plan:       Indication for cardiac consultation: Frequent PVCs with trigeminy noted on sleep study.    It is my pleasure to see your patient Kobe Bravo in consultation.  He is a very pleasant 48-year-old man who I have seen frequently in the past as I took care of both his mother and his father.  He is mother recently passed away which I was very sorry to hear.  This patient was noted during his sleep study to have relatively frequent PVCs and also PVCs in a trigeminal pattern.  The patient does not feel the PVCs and he does not have any palpitations nor tachycardia.  He has no symptoms of syncope nor near syncope.  He has no chest discomfort.  He has not been complaining of orthopnea PND or ankle edema.  A twelve-lead electrocardiogram which was performed today was normal.  He has not had a recent basic metabolic profile that I can see.  He is mother had a history of aortic stenosis for which she had an aortic valve replacement and his father had coronary artery disease.  He recently had significant issues with problematic alcohol intake and he also was voluntarily admitted to hospital for severe depression with planned suicide which fortunately did not take place as he sought help from the police.  He has been an extremely dutiful son and I have seen him for many years when he brought his mother in to the clinic with her cardiac condition.    Impression:  1.  Frequent PVCs on sleep study.  Obstructive sleep apnea alone can cause a plethora of arrhythmias of which PVCs are one of them.  However we do need to rule out ischemia and structural heart disease as well as more common causes of PVCs such as hypokalemia, hypomagnesemia and hyperparathyroidism.  2.  Recent history of depression and alcohol abuse.  Withdrawal from alcohol and depression of course can also cause PVCs due to the significantly increased stress that both of these conditions can cause.    Plan:  1.  We will obtain an echocardiogram to  see if the heart is structurally normal.  2.  I will have the patient wear a 24-hour Holter monitor to see the frequency of PVCs and also determine if any other arrhythmias are present.  3.  I will check a basic metabolic profile, TSH and magnesium.  4.  I would have the patient perform a stress EKG to determine if there is any evidence of ischemia and also to see if there is any inducible arrhythmias.  I will have the patient follow-up with the results of all of these tests.  5.  The patient is due to follow-up in the next week or 2 with the sleep lab which is the earliest that he could get to see them.  Treatment of obstructive sleep apnea in itself may significantly reduce the incidence of PVCs if this of course was the cause.    It has been my pleasure to be involved in the care of this extremely nice man and as always has been told to contact me with any questions or any concerns.    Aubrey Mike MD, FACC, FRCPI    Orders Placed This Encounter   Procedures    Basic metabolic panel    TSH with free T4 reflex    Magnesium    Follow-Up with Cardiology    EKG 12-lead complete w/read - Clinics (performed today)    Exercise Stress Test (Stress ECG)    Holter Monitor 24 hour Adult Pediatric    Echocardiogram Complete       No orders of the defined types were placed in this encounter.      There are no discontinued medications.      Encounter Diagnoses   Name Primary?    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       PAST SURGICAL HISTORY:  Past Surgical History:   Procedure Laterality Date    ARTHROSCOPY KNEE WITH MEDIAL MENISCECTOMY  1/23/2012    Procedure:ARTHROSCOPY KNEE WITH  "MEDIAL MENISCECTOMY; Left Knee scope with Partial Medial Menisectomy  ; Surgeon:ONOFRE PATRICIA; Location:[facility] OR    COLONOSCOPY Left 11/3/2022    Procedure: COLONOSCOPY;  Surgeon: Robel Davidson MD;  Location:  GI    ORTHOPEDIC SURGERY      marvin ACL repair    ORTHOPEDIC SURGERY      left foot fracture repair    SEPTOPLASTY      SOFT TISSUE SURGERY      multiple knee surgeries    wisdom teeth[         FAMILY HISTORY:  Family History   Problem Relation Age of Onset    Asthma Mother     Heart Disease Mother         A-fib    Cerebrovascular Disease Mother     Depression Mother     Obesity Mother     Sleep Apnea Mother     Sleep Apnea Father     Diabetes Father          complications of DM age 69    C.A.D. Father     Obesity Father     Alcohol/Drug Paternal Grandmother     Colon Cancer No family hx of        SOCIAL HISTORY:  Social History     Socioeconomic History    Marital status:      Spouse name: None    Number of children: None    Years of education: None    Highest education level: None   Tobacco Use    Smoking status: Never    Smokeless tobacco: Current     Types: Chew     Last attempt to quit: 2011   Vaping Use    Vaping Use: Never used   Substance and Sexual Activity    Alcohol use: Not Currently    Drug use: No    Sexual activity: Yes     Partners: Female     Birth control/protection: Male Surgical   Other Topics Concern    Parent/sibling w/ CABG, MI or angioplasty before 65F 55M? No       Review of Systems:  Skin:          Eyes:         ENT:         Respiratory:  Positive for sleep apnea     Cardiovascular:  Negative      Gastroenterology:        Genitourinary:         Musculoskeletal:         Neurologic:         Psychiatric:         Heme/Lymph/Imm:         Endocrine:           Physical Exam:  Vitals: /82 (BP Location: Right arm, Patient Position: Sitting, Cuff Size: Adult Large)   Pulse 76   Ht 1.74 m (5' 8.5\")   Wt 113.4 kg (250 lb)   SpO2 95%   BMI 37.46 kg/m  "     Constitutional:  cooperative, alert and oriented, well developed, well nourished, in no acute distress        Skin:  warm and dry to the touch, no apparent skin lesions or masses noted          Head:  no masses or lesions        Eyes:  pupils equal and round        Lymph:      ENT:  no pallor or cyanosis        Neck:  carotid pulses are full and equal bilaterally, JVP normal, no carotid bruit        Respiratory:  clear to auscultation;normal symmetry         Cardiac: regular rhythm;normal S1 and S2;no murmurs, gallops or rubs detected                pulses full and equal                                        GI:  not assessed this visit        Extremities and Muscular Skeletal:  no deformities, clubbing, cyanosis, erythema observed;no edema              Neurological:  no gross motor deficits;affect appropriate        Psych:  Alert and Oriented x 3        CC  Diaz Dave MD  [address] [address]

## 2023-09-21 ASSESSMENT — SLEEP AND FATIGUE QUESTIONNAIRES
HOW LIKELY ARE YOU TO NOD OFF OR FALL ASLEEP WHILE SITTING AND READING: WOULD NEVER DOZE
HOW LIKELY ARE YOU TO NOD OFF OR FALL ASLEEP WHILE SITTING INACTIVE IN A PUBLIC PLACE: WOULD NEVER DOZE
HOW LIKELY ARE YOU TO NOD OFF OR FALL ASLEEP WHEN YOU ARE A PASSENGER IN A CAR FOR AN HOUR WITHOUT A BREAK: WOULD NEVER DOZE
HOW LIKELY ARE YOU TO NOD OFF OR FALL ASLEEP WHILE LYING DOWN TO REST IN THE AFTERNOON WHEN CIRCUMSTANCES PERMIT: SLIGHT CHANCE OF DOZING
HOW LIKELY ARE YOU TO NOD OFF OR FALL ASLEEP WHILE SITTING QUIETLY AFTER LUNCH WITHOUT ALCOHOL: WOULD NEVER DOZE
HOW LIKELY ARE YOU TO NOD OFF OR FALL ASLEEP IN A CAR, WHILE STOPPED FOR A FEW MINUTES IN TRAFFIC: WOULD NEVER DOZE
HOW LIKELY ARE YOU TO NOD OFF OR FALL ASLEEP WHILE WATCHING TV: SLIGHT CHANCE OF DOZING
HOW LIKELY ARE YOU TO NOD OFF OR FALL ASLEEP WHILE SITTING AND TALKING TO SOMEONE: WOULD NEVER DOZE

## 2023-09-22 ENCOUNTER — OFFICE VISIT (OUTPATIENT)
Dept: SLEEP MEDICINE | Facility: CLINIC | Age: 48
End: 2023-09-22
Payer: COMMERCIAL

## 2023-09-22 VITALS
HEART RATE: 70 BPM | WEIGHT: 244 LBS | OXYGEN SATURATION: 96 % | BODY MASS INDEX: 36.14 KG/M2 | SYSTOLIC BLOOD PRESSURE: 128 MMHG | HEIGHT: 69 IN | DIASTOLIC BLOOD PRESSURE: 75 MMHG

## 2023-09-22 DIAGNOSIS — G47.52 RBD (REM BEHAVIORAL DISORDER): ICD-10-CM

## 2023-09-22 DIAGNOSIS — G47.33 OSA (OBSTRUCTIVE SLEEP APNEA): Primary | ICD-10-CM

## 2023-09-22 DIAGNOSIS — I49.3 FREQUENT PVCS: ICD-10-CM

## 2023-09-22 PROCEDURE — 99215 OFFICE O/P EST HI 40 MIN: CPT | Performed by: PHYSICIAN ASSISTANT

## 2023-09-22 NOTE — PROGRESS NOTES
"[facility]   Outpatient Sleep Medicine  Sep 22, 2023       Name: Kobe Bravo III MRN# [id number]   Age: 48 year old YOB: 1975            Assessment and Plan:   1. ANGELINE (obstructive sleep apnea)  During this visit, we reviewed the summary of the study including stage, position, event distribution, oximetry and heart rate. Sleep architecture showed all sleep stages present but decrease sleep efficiency of 68.4% secondary to wake after sleep onset, increased arousal index of 38.3 of which 33 were spontaneous.  Overall mild obstructive sleep apnea was identified with hypoxemia -AHI 6.5, RDI 7.7, Supine AHI 47.3, REM AHI 1.2, Low O2 86.0%, Time Spent ?88% 32.3 minutes / Time Spent ?89% 87.3 minutes. REM sleep without atonia was noted.  1-lead EKG showed sinus rhythm with frequent PVCs occurring in trigeminy pattern and was referred to cardiology for evaluation.    Discussed potential treatment options for his mild sleep apnea including re-trial of CPAP, re-starting mandibular advancement device, positional restriction and lastly consideration of surgical options. He would like to get back to using his oral appliance and will follow-up with his dentist for fit optimization. Could consider HST with oral appliance to verify efficacy pending progress.     2. RBD (REM behavioral disorder)  Patient has history of 1 episode of dream enactment at home and with the finding of REM sleep without atonia raises concern for RBD, though he is also on Lexapro. Discussed option of referring to Dr. Hawkins RBD clinic and he was interested, will arrange consult with Dr. Hawkins.     3. Frequent PVCs  Continue to follow with cardiology.     Of note, patient now sober from alcohol for over 2 months. Congratulated him on this. States his sleep has been \"awesome\" since sober.     Will follow-up with me for now as needed/pending the above.        Chief Complaint      Chief Complaint   Patient presents with    " "Results     Follow up sleep study results           History of Present Illness:   Everardo Vega III is a 48 year old male who presents to the clinic for results of sleep study completed on 6/19/23. Study was completed to evaluate for ANGELINE/RBD.     Recall, patient was previously diagnosed with mild ANGELINE via HST completed 1/30/2019 (224#, BMI 33.56) revealing AHI 10.9, supine AHI 11.9, nonsupine AHI 6.5, REINALDO 5.0, snore index 28.3, oxygen harvey 80%, 6 minutes with oxygen saturation below 88%.    Reviewed results of sleep study with patient as follows:   SLEEP STUDY INTERPRETATION  DIAGNOSTIC POLYSOMNOGRAPHY REPORT   Patient: EVERARDO VEGA  YOB: 1975  Study Date: 6/19/2023  MRN: [id number]  Referring Provider: KARTHIKEYAN Lucero  Ordering Provider: MD Diaz Dave     Indications for Polysomnography: The patient is a 48 year old Male who is 5' 8\" and weighs 255.0 lbs. His BMI is 39.1, [place] sleepiness scale 4 and neck circumference is 43 cm. A diagnostic polysomnogram was performed to evaluate for sleep apnea.     Polysomnogram Data: A full night polysomnogram recorded the standard physiologic parameters including EEG, EOG, EMG, ECG, nasal and oral airflow. Respiratory parameters of chest and abdominal movements were recorded with respiratory inductance plethysmography. Oxygen saturation was recorded by pulse oximetry. Hypopnea scoring rule used: 1B 4%.     Sleep Architecture: Fragmented sleep with reduced sleep efficiency.   The total recording time of the polysomnogram was 444.2 minutes. The total sleep time was 304.0 minutes. Sleep latency was increased at 25.0 minutes with the use of Hydroxyzine as a sleep aid. REM latency was 138.5 minutes. Arousal index was increased at 42.8 arousals per hour. Sleep efficiency was decreased at 68.4%. Wake after sleep onset was 115.0 minutes. The patient spent 24.0% of total sleep time in Stage N1, 46.7% in Stage N2, 13.3% in Stage N3, and 16.0% in REM. Time " in REM supine was - minutes.     Respiration: Mild obstructive sleep apnea.   Events ? The polysomnogram revealed a presence of 9 obstructive, 2 central, and - mixed apneas resulting in an apnea index of 2.2 events per hour. There were 22 obstructive hypopneas and - central hypopneas resulting in an obstructive hypopnea index of 4.3 and central hypopnea index of - events per hour. The combined apnea/hypopnea index was 6.5 events per hour (central apnea/hypopnea index was 0.4 events per hour). The REM AHI was 1.2 events per hour. The supine AHI was 47.3 events per hour. The RERA index was 1.2 events per hour.  The RDI was 7.7 events per hour.  Snoring - was reported as moderate.  Respiratory rate and pattern - was notable for normal respiratory rate and pattern.  Sustained Sleep Associated Hypoventilation - Transcutaneous carbon dioxide monitoring was not used.  Sleep Associated Hypoxemia - (Greater than 5 minutes O2 sat at or below 88%) was present. Baseline oxygen saturation was 90.6%. Lowest oxygen saturation was 86.0%. Time spent less than or equal to 88% was 32.3 minutes. Time spent less than or equal to 89% was 87.3 minutes.     Movement Activity: REM sleep without atonia was noted.   Periodic Limb Activity - There were 13 PLMs during the entire study. The PLM index was 2.6 movements per hour. The PLM Arousal Index was 1.2 per hour.  REM EMG Activity - Excessive transient muscle activity was present.  Nocturnal Behavior - Abnormal sleep related behaviors were not noted during/arising out of NREM / REM sleep.   Bruxism - None apparent.     Cardiac Summary: Sinus rhythm with frequent PVCs occurring in trigeminy pattern.   The average pulse rate was 60.4 bpm. The minimum pulse rate was 34.0 bpm while the maximum pulse rate was 103.0 bpm.  Arrhythmias were noted.             Assessment:   This sleep study shows mild degree of obstructive sleep apnea. Of note, there was minimal supine sleep during this test. If  "patient's normal sleep pattern includes substantial proportion of supine sleep, there could be underestimation of sleep apnea.    EKG showed frequent PVCs occurring in a trigeminy pattern and with ventricular couplets.   REM sleep without atonia was noted. If there is a clinical history of dream enactment behavior, consider presence of REM sleep behavioral disorder. REM sleep without atonia can be an incidental finding with use of serotonergic antidepressants.       Recommendations:  If treatment of mild obstructive sleep apnea is clinically indicated, consider following therapy options.  Patient may be a candidate for dental appliance through referral to Sleep Dentistry for the treatment of obstructive sleep apnea.  If there is excessive daytime sleepiness or other qualifying medical comorbidity, treatment could be empirically initiated with Auto?titrating PAP therapy with a range of 5 to 15 cmH2O. Recommend clinical follow up with sleep management team.  Suggest optimizing sleep schedule and avoiding sleep deprivation.  Weight management (if BMI > 30).  Consider Cardiology consultation for frequent PVCs.     Past medical/surgical history, family history, social history, medications and allergies were reviewed.           Physical Examination:   /75   Pulse 70   Ht 1.74 m (5' 8.5\")   Wt 110.7 kg (244 lb)   SpO2 96%   BMI 36.56 kg/m    General appearance: Awake, alert, cooperative. Well groomed. Sitting comfortably in chair. In no apparent distress.  HEENT: Head: Normocephalic, atraumatic. Eyes:Conjunctiva clear. Sclera normal. Nose: External appearance without deformity.   Pulmonary:  Able to speak easily in full sentences. No cough or wheeze.   Skin:  No rashes or significant lesions on visible skin.   Neurologic: Alert, oriented x3.   Psychiatric: Mood euthymic. Affect congruent with full range and intensity.      CC:  Sukumar Lucero PA-C  Sep 22, 2023     [facility] " [facility] [place]  [address] , [place] [address]     [facility] [place]  [address] [facility], [place] [address]    Chart documentation was completed, in part, with [employer] voice-recognition software. Even though reviewed, some grammatical, spelling, and word errors may remain.    41 minutes spent on day of encounter doing chart review, history and exam, documentation, and further activities as noted above

## 2023-09-22 NOTE — NURSING NOTE
"Chief Complaint   Patient presents with    Results     Follow up sleep study results        Initial /75   Pulse 70   Ht 1.74 m (5' 8.5\")   Wt 110.7 kg (244 lb)   SpO2 96%   BMI 36.56 kg/m   Estimated body mass index is 36.56 kg/m  as calculated from the following:    Height as of this encounter: 1.74 m (5' 8.5\").    Weight as of this encounter: 110.7 kg (244 lb).    Medication Reconciliation: complete  ESS 2  LYNN 5  Dee Vaelnte MA      "

## 2023-09-25 ENCOUNTER — HOSPITAL ENCOUNTER (OUTPATIENT)
Dept: CARDIOLOGY | Facility: CLINIC | Age: 48
Discharge: HOME | End: 2023-09-25
Attending: INTERNAL MEDICINE
Payer: COMMERCIAL

## 2023-09-25 DIAGNOSIS — I49.3 FREQUENT PVCS: ICD-10-CM

## 2023-09-25 DIAGNOSIS — G47.33 OSA (OBSTRUCTIVE SLEEP APNEA): ICD-10-CM

## 2023-09-25 PROCEDURE — 93227 XTRNL ECG REC<48 HR R&I: CPT | Performed by: INTERNAL MEDICINE

## 2023-09-25 PROCEDURE — 93018 CV STRESS TEST I&R ONLY: CPT | Performed by: INTERNAL MEDICINE

## 2023-09-25 PROCEDURE — 93225 XTRNL ECG REC<48 HRS REC: CPT

## 2023-09-25 PROCEDURE — 93017 CV STRESS TEST TRACING ONLY: CPT

## 2023-09-25 PROCEDURE — 93016 CV STRESS TEST SUPVJ ONLY: CPT | Performed by: INTERNAL MEDICINE

## 2023-10-13 ENCOUNTER — HOSPITAL ENCOUNTER (OUTPATIENT)
Dept: CARDIOLOGY | Facility: CLINIC | Age: 48
Discharge: HOME | End: 2023-10-13
Attending: INTERNAL MEDICINE | Admitting: INTERNAL MEDICINE
Payer: COMMERCIAL

## 2023-10-13 DIAGNOSIS — I49.3 FREQUENT PVCS: ICD-10-CM

## 2023-10-13 DIAGNOSIS — G47.33 OSA (OBSTRUCTIVE SLEEP APNEA): ICD-10-CM

## 2023-10-13 LAB — LVEF ECHO: NORMAL

## 2023-10-13 PROCEDURE — 93306 TTE W/DOPPLER COMPLETE: CPT | Mod: 26 | Performed by: INTERNAL MEDICINE

## 2023-10-13 PROCEDURE — 93306 TTE W/DOPPLER COMPLETE: CPT

## 2023-10-23 ENCOUNTER — OFFICE VISIT (OUTPATIENT)
Dept: SLEEP MEDICINE | Facility: CLINIC | Age: 48
End: 2023-10-23
Payer: COMMERCIAL

## 2023-10-23 VITALS
SYSTOLIC BLOOD PRESSURE: 116 MMHG | DIASTOLIC BLOOD PRESSURE: 76 MMHG | HEIGHT: 69 IN | OXYGEN SATURATION: 93 % | BODY MASS INDEX: 36.23 KG/M2 | HEART RATE: 73 BPM | WEIGHT: 244.6 LBS

## 2023-10-23 DIAGNOSIS — G47.52 RBD (REM BEHAVIORAL DISORDER): Primary | ICD-10-CM

## 2023-10-23 PROCEDURE — 99215 OFFICE O/P EST HI 40 MIN: CPT | Performed by: PSYCHIATRY & NEUROLOGY

## 2023-10-23 NOTE — NURSING NOTE
"Chief Complaint   Patient presents with    Sleep Problem     Sleep concerns       Initial /76   Pulse 73   Ht 1.74 m (5' 8.5\")   Wt 110.9 kg (244 lb 9.6 oz)   SpO2 93%   BMI 36.65 kg/m   Estimated body mass index is 36.65 kg/m  as calculated from the following:    Height as of this encounter: 1.74 m (5' 8.5\").    Weight as of this encounter: 110.9 kg (244 lb 9.6 oz).    Medication Reconciliation: complete  ESS 2  Neck circumference: 48 centimeters.  Crys Barry MA       "
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
## 2023-11-14 ENCOUNTER — TELEPHONE (OUTPATIENT)
Dept: FAMILY MEDICINE | Facility: CLINIC | Age: 48
End: 2023-11-14

## 2023-11-14 NOTE — TELEPHONE ENCOUNTER
I no nothing about this request. Patient would need visit to discuss and would need to confirm in network as well

## 2023-11-14 NOTE — TELEPHONE ENCOUNTER
Vi with [facility] calls to request a referral for patient to be seen for chiropratic care     Clinic Ph: [phone]  Fx: [phone]    Vi stated that patient needs a ref from PCP     Patient call back number [phone]

## 2023-11-16 ENCOUNTER — MYC MEDICAL ADVICE (OUTPATIENT)
Dept: FAMILY MEDICINE | Facility: CLINIC | Age: 48
End: 2023-11-16
Payer: COMMERCIAL

## 2023-11-16 NOTE — TELEPHONE ENCOUNTER
Reviewed 11/14/2023 telephone encounter.   A visit was advised by JESSICA Florian.   Will confirm with patient to keep the appointment.

## 2023-11-20 ENCOUNTER — OFFICE VISIT (OUTPATIENT)
Dept: CARDIOLOGY | Facility: CLINIC | Age: 48
End: 2023-11-20
Attending: INTERNAL MEDICINE
Payer: COMMERCIAL

## 2023-11-20 VITALS
BODY MASS INDEX: 36.78 KG/M2 | HEIGHT: 69 IN | SYSTOLIC BLOOD PRESSURE: 122 MMHG | WEIGHT: 248.3 LBS | DIASTOLIC BLOOD PRESSURE: 78 MMHG | HEART RATE: 84 BPM

## 2023-11-20 DIAGNOSIS — Z78.9 ALCOHOL USE: ICD-10-CM

## 2023-11-20 DIAGNOSIS — G47.33 OSA (OBSTRUCTIVE SLEEP APNEA): ICD-10-CM

## 2023-11-20 DIAGNOSIS — I49.3 FREQUENT PVCS: Primary | ICD-10-CM

## 2023-11-20 PROBLEM — F10.90 ALCOHOL USE: Status: ACTIVE | Noted: 2023-11-20

## 2023-11-20 PROCEDURE — 99214 OFFICE O/P EST MOD 30 MIN: CPT | Performed by: NURSE PRACTITIONER

## 2023-11-20 NOTE — PROGRESS NOTES
CARDIOLOGY CLINIC NOTE    PRIMARY CARDIOLOGIST  Dr. Lei Mike    PRIMARY CARE PHYSICIAN:  Sukumar Lucero    HISTORY OF PRESENT ILLNESS:  Kobe Bravo is a very pleasant 48 year old male with a PMH significant for PVC's ( identified during a sleep study). Alcohol abuse, mild sleep apnea and depression.     He was last seen on 9/18/2023 in consultation with Dr. Wick for evaluation of PVCs.  For further work-up, he underwent a 24-hour Holter monitor that shows no significant arrhythmia.  To rule out ischemia he underwent a normal exercise stress test.  An echocardiogram showed a normal ejection fraction estimated at 55 to 60%, normal RV size and function, no significant valvular disease.    He returns to the office today for review of results.  Overall, he is feeling well on a cardiac standpoint, denies chest pain with the exception of a localized left armpit discomfort that has been chronic.  He uses 800 mg of ibuprofen daily.  However, he has recently started a more holistic approach for pain management.  He denies shortness of breath, palpitations, PND, orthopnea, presyncope, syncope, or lower extremity edema.    Blood pressure is well controlled at 122/78  Weight is stable at 248 pounds.  He works out regularly  Follows a strict heart healthy diet.    He has been sober for 4 months which I congratulated him for.  He does not smoke.  He recently underwent a sleep study that was mild.  He uses a dental appliance.      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       SOCIAL HISTORY:  I have reviewed this patient's social history and updated it with pertinent information if needed. Kobe Bravo III  reports that he has never  smoked. His smokeless tobacco use includes chew. He reports that he does not currently use alcohol. He reports that he does not use drugs.    PHYSICAL EXAM:  Pulse:  [84] 84  BP: (122)/(78) 122/78  248 lbs 4.8 oz    Constitutional: alert, no distress  Respiratory: Good bilateral air entry  Cardiovascular: Regular rate and rhythm  GI: nondistended  Neuropsychiatric: appropriate affact    ASSESSMENT/PLAN:  Pertinent issues addressed/ reviewed during this cardiology visit  PVCs -24-hour Holter monitor showed no significant PVC burden or arrhythmia.  Exercise stress test and echocardiogram were normal.  Magnesium, TSH and potassium levels were normal.      2.  Mild sleep apnea -uses dental appliance  3.  Alcohol use -stopped x4 months    Follow-up with PCP as scheduled and  heart as needed.    It was a pleasure seeing this patient in clinic today. Please do not hesitate to contact me with any future questions.     SUMEET Lou, CNP  Cardiology - [facility]  11/20/2023    Review of the result(s) of each unique test - Last Holter monitor, echocardiogram, stress test, BMP, TSH, lipid panel.     The level of medical decision making during this visit was of moderate complexity.    This note was completed in part using dictation via the Dragon voice recognition software. Some word and grammatical errors may occur and must be interpreted in the appropriate clinical context.  If there are any questions pertaining to this issue, please contact me for further clarification.

## 2023-11-20 NOTE — LETTER
11/20/2023    Sukumar Lucero PA-C  [address]  [facility] [address]    RE: Kobe Bravo III       Dear Colleague,     I had the pleasure of seeing Kobe Bravo III in the [facility].  CARDIOLOGY CLINIC NOTE    PRIMARY CARDIOLOGIST  Dr. Lei Mike    PRIMARY CARE PHYSICIAN:  Sukumar Lucero    HISTORY OF PRESENT ILLNESS:  Kobe Bravo is a very pleasant 48 year old male with a PMH significant for PVC's ( identified during a sleep study). Alcohol abuse, mild sleep apnea and depression.     He was last seen on 9/18/2023 in consultation with Dr. Wick for evaluation of PVCs.  For further work-up, he underwent a 24-hour Holter monitor that shows no significant arrhythmia.  To rule out ischemia he underwent a normal exercise stress test.  An echocardiogram showed a normal ejection fraction estimated at 55 to 60%, normal RV size and function, no significant valvular disease.    He returns to the office today for review of results.  Overall, he is feeling well on a cardiac standpoint, denies chest pain with the exception of a localized left armpit discomfort that has been chronic.  He uses 800 mg of ibuprofen daily.  However, he has recently started a more holistic approach for pain management.  He denies shortness of breath, palpitations, PND, orthopnea, presyncope, syncope, or lower extremity edema.    Blood pressure is well controlled at 122/78  Weight is stable at 248 pounds.  He works out regularly  Follows a strict heart healthy diet.    He has been sober for 4 months which I congratulated him for.  He does not smoke.  He recently underwent a sleep study that was mild.  He uses a dental appliance.      PAST MEDICAL HISTORY:  Past Medical History:   Diagnosis Date    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       MEDICATIONS:  Current Outpatient Medications   Medication    Bioflavonoid Products (SUPER C-500 PO)    escitalopram (LEXAPRO) 10 MG tablet     No current  facility-administered medications for this visit.       SOCIAL HISTORY:  I have reviewed this patient's social history and updated it with pertinent information if needed. Kobe Bravo III  reports that he has never smoked. His smokeless tobacco use includes chew. He reports that he does not currently use alcohol. He reports that he does not use drugs.    PHYSICAL EXAM:  Pulse:  [84] 84  BP: (122)/(78) 122/78  248 lbs 4.8 oz    Constitutional: alert, no distress  Respiratory: Good bilateral air entry  Cardiovascular: Regular rate and rhythm  GI: nondistended  Neuropsychiatric: appropriate affact    ASSESSMENT/PLAN:  Pertinent issues addressed/ reviewed during this cardiology visit  PVCs -24-hour Holter monitor showed no significant PVC burden or arrhythmia.  Exercise stress test and echocardiogram were normal.  Magnesium, TSH and potassium levels were normal.      2.  Mild sleep apnea -uses dental appliance  3.  Alcohol use -stopped x4 months    Follow-up with PCP as scheduled and  heart as needed.    It was a pleasure seeing this patient in clinic today. Please do not hesitate to contact me with any future questions.     SUMEET Lou, CNP  Cardiology - [facility]  11/20/2023    Review of the result(s) of each unique test - Last Holter monitor, echocardiogram, stress test, BMP, TSH, lipid panel.     The level of medical decision making during this visit was of moderate complexity.    This note was completed in part using dictation via the Dragon voice recognition software. Some word and grammatical errors may occur and must be interpreted in the appropriate clinical context.  If there are any questions pertaining to this issue, please contact me for further clarification.      Thank you for allowing me to participate in the care of your patient.      Sincerely,     SUMEET Lou [facility]  cc:   Aubrey Wick MD  [id number]  [place] [address]

## 2023-11-20 NOTE — PATIENT INSTRUCTIONS
Thanks for participating in a office visit with the [facility] clinic today.    Reviewed results of stress test, monitor and echo - stable.   Blood pressures well controlled   Encourage exercise weight loss, and heart healthy diet  Avoid triggers such excessive caffeine, alcohol, and cold medications.     Follow up with  heart as needed     Please call my nurse at  [phone] with any questions or concerns.    Scheduling phone number: [phone]  Reminder: Please bring in all current medications, over the counter supplements and vitamin bottles to your next appointment.

## 2023-11-24 ENCOUNTER — VIRTUAL VISIT (OUTPATIENT)
Dept: FAMILY MEDICINE | Facility: CLINIC | Age: 48
End: 2023-11-24
Payer: COMMERCIAL

## 2023-11-24 DIAGNOSIS — M87.076 AVASCULAR NECROSIS OF BONE OF FOOT (H): Primary | ICD-10-CM

## 2023-11-24 PROCEDURE — 99213 OFFICE O/P EST LOW 20 MIN: CPT | Mod: VID | Performed by: PHYSICIAN ASSISTANT

## 2023-11-24 NOTE — PROGRESS NOTES
"Rajeev is a 48 year old who is being evaluated via a billable video visit.      How would you like to obtain your AVS? MyChart  If the video visit is dropped, the invitation should be resent by: Text to cell phone: [phone]  Will anyone else be joining your video visit? No          Assessment & Plan     Avascular necrosis of bone of foot (H)  Referral placed. Hoping to achieve any symptom improvement while he holds off from surgery. Recommend trying to avoid nsaids if possible, tylenol dosing reviewed.   - Chiropractic Referral; Future       BMI:   Estimated body mass index is 37.2 kg/m  as calculated from the following:    Height as of 11/20/23: 1.74 m (5' 8.5\").    Weight as of 11/20/23: 112.6 kg (248 lb 4.8 oz).           Sukumar Lucero PA-C  [facility] [place]    Subjective   Rajeev is a 48 year old, presenting for the following health issues:  Referral and Trauma    Pt wants a referral for a chiropractor for ankle  He mentions starting to deal with some talar issues a few years ago  He started with unknown etiology ankle pain and started seeing multiple orthos to help with it  Most recently the last ortho simply said to put off surgery as long as possible  Eventually went to a chiropractor to see if they could offer any help   -has had a sit down/eval with them but they are requiring referral   -then last week he had a first adjustment and treatment  Mentions the entire right side of the lower extremity has a hx of msk/bony pathology  Even up to the lower back  He has significant amount of xray and MRI with ortho - noted AVN talus  He mentions periodically taking 800mg ibuprofen daily but now trying to wean off of that        Review of Systems   Constitutional, HEENT, cardiovascular, pulmonary, gi and gu systems are negative, except as otherwise noted.      Objective           Vitals:  No vitals were obtained today due to virtual visit.    Physical Exam   GENERAL: Healthy, alert and no " distress  EYES: Eyes grossly normal to inspection.  No discharge or erythema, or obvious scleral/conjunctival abnormalities.  RESP: No audible wheeze, cough, or visible cyanosis.  No visible retractions or increased work of breathing.    SKIN: Visible skin clear. No significant rash, abnormal pigmentation or lesions.  NEURO: Cranial nerves grossly intact.  Mentation and speech appropriate for age.  PSYCH: Mentation appears normal, affect normal/bright, judgement and insight intact, normal speech and appearance well-groomed.          Video-Visit Details    Type of service:  Video Visit     Originating Location (pt. Location): Home    Distant Location (provider location):  On-site  Platform used for Video Visit: Ghazala

## 2023-12-22 ENCOUNTER — MYC REFILL (OUTPATIENT)
Dept: FAMILY MEDICINE | Facility: CLINIC | Age: 48
End: 2023-12-22
Payer: COMMERCIAL

## 2023-12-22 ENCOUNTER — MYC MEDICAL ADVICE (OUTPATIENT)
Dept: FAMILY MEDICINE | Facility: CLINIC | Age: 48
End: 2023-12-22
Payer: COMMERCIAL

## 2023-12-22 DIAGNOSIS — F41.1 GAD (GENERALIZED ANXIETY DISORDER): ICD-10-CM

## 2023-12-22 RX ORDER — ESCITALOPRAM OXALATE 10 MG/1
10 TABLET ORAL DAILY
Qty: 90 TABLET | Refills: 1 | OUTPATIENT
Start: 2023-12-22

## 2024-01-06 ASSESSMENT — SLEEP AND FATIGUE QUESTIONNAIRES
HOW LIKELY ARE YOU TO NOD OFF OR FALL ASLEEP WHILE SITTING QUIETLY AFTER LUNCH WITHOUT ALCOHOL: WOULD NEVER DOZE
HOW LIKELY ARE YOU TO NOD OFF OR FALL ASLEEP WHILE SITTING INACTIVE IN A PUBLIC PLACE: WOULD NEVER DOZE
HOW LIKELY ARE YOU TO NOD OFF OR FALL ASLEEP WHILE SITTING AND TALKING TO SOMEONE: WOULD NEVER DOZE
HOW LIKELY ARE YOU TO NOD OFF OR FALL ASLEEP WHEN YOU ARE A PASSENGER IN A CAR FOR AN HOUR WITHOUT A BREAK: WOULD NEVER DOZE
HOW LIKELY ARE YOU TO NOD OFF OR FALL ASLEEP WHILE WATCHING TV: SLIGHT CHANCE OF DOZING
HOW LIKELY ARE YOU TO NOD OFF OR FALL ASLEEP IN A CAR, WHILE STOPPED FOR A FEW MINUTES IN TRAFFIC: WOULD NEVER DOZE
HOW LIKELY ARE YOU TO NOD OFF OR FALL ASLEEP WHILE SITTING AND READING: SLIGHT CHANCE OF DOZING
HOW LIKELY ARE YOU TO NOD OFF OR FALL ASLEEP WHILE LYING DOWN TO REST IN THE AFTERNOON WHEN CIRCUMSTANCES PERMIT: SLIGHT CHANCE OF DOZING

## 2024-01-08 ENCOUNTER — VIRTUAL VISIT (OUTPATIENT)
Dept: SLEEP MEDICINE | Facility: CLINIC | Age: 49
End: 2024-01-08
Payer: COMMERCIAL

## 2024-01-08 VITALS — HEIGHT: 69 IN | BODY MASS INDEX: 34.07 KG/M2 | WEIGHT: 230 LBS

## 2024-01-08 DIAGNOSIS — G47.33 OSA (OBSTRUCTIVE SLEEP APNEA): Primary | ICD-10-CM

## 2024-01-08 PROCEDURE — 99213 OFFICE O/P EST LOW 20 MIN: CPT | Mod: 95 | Performed by: PSYCHIATRY & NEUROLOGY

## 2024-01-08 ASSESSMENT — PAIN SCALES - GENERAL: PAINLEVEL: NO PAIN (0)

## 2024-01-08 NOTE — PROGRESS NOTES
Virtual Visit Details    Type of service:  Video Visit     Originating Location (pt. Location): Home    Distant Location (provider location):  On-site  Platform used for Video Visit: [facility]      Sleep Clinic - Follow-up Visit:    Impression/Plan:  Kobe Bravo III presents for follow-up of their mild ANGELINE and RBD 5HT.    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 Time spent with O2 <88% 32.3 min  -Previously did well with oral appliance though it has been broken for 6 months  -Has upcoming appt with sleep dental  -Never drive if tired or sleepy         Kobe Bravo III will follow up in about 6 months via video visit        CC:  Sukumar Lucero      Patient seen and discussed with attending Dr Ross Palomo MD  Sleep Medicine Fellow    Outpatient Sleep Medicine Staff  I have seen and evaluated the patient and discussed with the sleep fellow on 1-8-24.  I supervised the fellow during the visit and agree with the documentation.      In addition to face to face time I have also reviewed the patients chart, coordinated care, assisted in placing orders and documentation.  Total time (Face-to-Face, care coordination, orders, documentation) spent on 1-8-24 was 15 minutes.     Delmar Hawkins MD          History of Present Illness:  Chief Complaint   Patient presents with    RECHECK       Kobe Bravo III presents for follow-up of their mild ANGELINE and RBD 5HT.    Last clinic visit 10/2023. At last visit, for his RBD, there was a discussion of melatonin 3  "mg at bedtime if he had any progressively worsening episodes of dream enactment behaivor. He was also referred to sleep dental clinic through [facility] for oral appliance.    PSG on 6/23: AHI 6.5, RDI 7.7, Supine AHI 47.3, REM AHI 1.2, Low O2 86.0%, Time Spent ?88% 32.3 minutes / Time Spent ?89% 87.3 minutes. Evidence of RSWA seen on PSG.    Since last visit, patient reports things have been stable overall. He has not started use of oral appliance. He has an appointment with his sleep dentist in about 1-2 weeks. He last used it about 6 months ago and stopped due to it breaking. He does report loud, disruptive snoring but denies waking up choking/gasping for air or witnessed apneas. Reports \"fulfilling sleep\" currently.    In regards to dream enactment, he has not had any further episodes aside from the one episode that he had earlier this year. He has not started melatonin. Denies any injury or falls out of bed.    No changes in balance   No constipation  No orthostasis  No tremor  No change in smell  No change in memory       Sleep wake schedule:  Bedtime: 9-930pm  Sleep latency: <10 min  Nocturnal awakenings: rare  Arise time: 530am  Naps: none; does not doze off       EPWORTH SLEEPINESS SCALE         1/6/2024     7:16 AM    Dublin Sleepiness Scale ( [place]  5423-2818<br>ESS - [place]/English - Final version - 21 Nov 07 - [facility] [place].)   Sitting and reading Slight chance of dozing   Watching TV Slight chance of dozing   Sitting, inactive in a public place (e.g. a theatre or a meeting) Would never doze   As a passenger in a car for an hour without a break Would never doze   Lying down to rest in the afternoon when circumstances permit Slight chance of dozing   Sitting and talking to someone Would never doze   Sitting quietly after a lunch without alcohol Would never doze   In a car, while stopped for a few minutes in traffic Would never doze   Dublin Score (MC) 3   Dublin Score (Sleep) 3 " "      INSOMNIA SEVERITY INDEX (LYNN)          9/21/2023    12:10 PM   Insomnia Severity Index (LYNN)   Difficulty falling asleep 1   Difficulty staying asleep 1   Problems waking up too early 0   How SATISFIED/DISSATISFIED are you with your CURRENT sleep pattern? 1   How NOTICEABLE to others do you think your sleep problem is in terms of impairing the quality of your life? 1   How WORRIED/DISTRESSED are you about your current sleep problem? 1   To what extent do you consider your sleep problem to INTERFERE with your daily functioning (e.g. daytime fatigue, mood, ability to function at work/daily chores, concentration, memory, mood, etc.) CURRENTLY? 0   LYNN Total Score 5       Guidelines for Scoring/Interpretation:  Total score categories:  0-7 = No clinically significant insomnia   8-14 = Subthreshold insomnia   15-21 = Clinical insomnia (moderate severity)  22-28 = Clinical insomnia (severe)  Used via courtesy of www.LocaMapth.va.gov with permission from Po Campos PhD., [facility]      Past medical/surgical history, family history, social history, medications and allergies were reviewed.        Problem List:  Patient Active Problem List    Diagnosis Date Noted    Alcohol use 11/20/2023     Priority: Medium    Hepatic steatosis 09/11/2023     Priority: Medium    Anxiety 01/01/2022     Priority: Medium    Morbid obesity (H) 03/19/2021     Priority: Medium    ANGELINE (obstructive sleep apnea) 02/18/2019     Priority: Medium     Using dental appliance        CARDIOVASCULAR SCREENING; LDL GOAL LESS THAN 160 10/31/2010     Priority: Medium        Ht 1.753 m (5' 9\")   Wt 104.3 kg (230 lb)   BMI 33.97 kg/m        "

## 2024-01-08 NOTE — NURSING NOTE
Is the patient currently in the state of [place]? YES    Visit mode:VIDEO    If the visit is dropped, the patient can be reconnected by: VIDEO VISIT: Text to cell phone:   Telephone Information:   Mobile [phone]       Will anyone else be joining the visit? NO  (If patient encounters technical issues they should call [phone])    How would you like to obtain your AVS? MyChart    Are changes needed to the allergy or medication list? Pt stated no med changes    Reason for visit: RECHECK    Becky DOMINGUEZF  Has patient had flu shot for current/most recent flu season? If so, when? No

## 2024-01-19 ENCOUNTER — TRANSFERRED RECORDS (OUTPATIENT)
Dept: HEALTH INFORMATION MANAGEMENT | Facility: CLINIC | Age: 49
End: 2024-01-19
Payer: COMMERCIAL

## 2024-02-06 ENCOUNTER — OFFICE VISIT (OUTPATIENT)
Dept: OPTOMETRY | Facility: CLINIC | Age: 49
End: 2024-02-06
Payer: COMMERCIAL

## 2024-02-06 DIAGNOSIS — H52.4 PRESBYOPIA: Primary | ICD-10-CM

## 2024-02-06 DIAGNOSIS — H52.13 MYOPIA OF BOTH EYES: ICD-10-CM

## 2024-02-06 PROCEDURE — 92004 COMPRE OPH EXAM NEW PT 1/>: CPT | Performed by: OPTOMETRIST

## 2024-02-06 PROCEDURE — 92015 DETERMINE REFRACTIVE STATE: CPT | Performed by: OPTOMETRIST

## 2024-02-06 ASSESSMENT — CUP TO DISC RATIO
OD_RATIO: 0.5
OS_RATIO: 0.5

## 2024-02-06 ASSESSMENT — REFRACTION_MANIFEST
OD_SPHERE: -0.75
OS_SPHERE: -0.50
OD_AXIS: 140
OD_CYLINDER: +0.25
OD_SPHERE: -0.50
OS_CYLINDER: +0.25
METHOD_AUTOREFRACTION: 1
OS_SPHERE: -0.75
OS_CYLINDER: SPHERE
OD_CYLINDER: +0.25
OD_ADD: +1.75
OS_AXIS: 063
OS_ADD: +1.75
OD_AXIS: 033

## 2024-02-06 ASSESSMENT — CONF VISUAL FIELD
OS_SUPERIOR_NASAL_RESTRICTION: 0
OD_NORMAL: 1
OS_INFERIOR_TEMPORAL_RESTRICTION: 0
OD_SUPERIOR_NASAL_RESTRICTION: 0
OD_SUPERIOR_TEMPORAL_RESTRICTION: 0
OD_INFERIOR_TEMPORAL_RESTRICTION: 0
OS_INFERIOR_NASAL_RESTRICTION: 0
METHOD: COUNTING FINGERS
OS_NORMAL: 1
OD_INFERIOR_NASAL_RESTRICTION: 0
OS_SUPERIOR_TEMPORAL_RESTRICTION: 0

## 2024-02-06 ASSESSMENT — VISUAL ACUITY
OS_SC: 20/20
OS_SC+: -1
CORRECTION_TYPE: GLASSES
OD_SC: 20/40
METHOD: SNELLEN - LINEAR
OD_CC: 20/20
OD_SC+: -2
OS_CC: 20/30-1
OD_SC: 20/20
OS_SC: 20/40

## 2024-02-06 ASSESSMENT — REFRACTION_WEARINGRX
OD_CYLINDER: +0.25
OD_SPHERE: +0.25
OS_AXIS: 047
OS_ADD: +1.50
OS_CYLINDER: +0.75
SPECS_TYPE: PAL
OD_ADD: +1.50
OD_AXIS: 140
OS_SPHERE: PLANO

## 2024-02-06 ASSESSMENT — TONOMETRY
OS_IOP_MMHG: 17
OD_IOP_MMHG: 17
IOP_METHOD: APPLANATION

## 2024-02-06 ASSESSMENT — EXTERNAL EXAM - LEFT EYE: OS_EXAM: ROSACEA

## 2024-02-06 ASSESSMENT — EXTERNAL EXAM - RIGHT EYE: OD_EXAM: ROSACEA

## 2024-02-06 ASSESSMENT — SLIT LAMP EXAM - LIDS
COMMENTS: NORMAL
COMMENTS: NORMAL

## 2024-02-06 NOTE — LETTER
2/6/2024         RE: Kobe Bravo III  [address] [place] [address]        Dear Colleague,    Thank you [facility] referring your patient, [facility]ravo [facility]. Please see a copy of my visit note below.    Chief Complaint   Patient presents with     Annual Eye Exam        Last Eye Exam: 1-2 years ago   Dilated Previously: Yes, side effects of dilation explained today    What are you currently using to see?  Glasses - has a PAL uses only for reading        Distance Vision Acuity: Satisfied with vision unaided     Near Vision Acuity: Satisfied with vision while reading and using computer with glasses    Eye Comfort: good  Do you use eye drops? : No      Joann Stallworth - Optometric Assistant         Fohx; glaucoma both parents    Medical, surgical and family histories reviewed and updated 2/6/2024.       OBJECTIVE: See Ophthalmology exam    ASSESSMENT:    ICD-10-CM    1. Presbyopia  H52.4 EYE EXAM (SIMPLE-NONBILLABLE)     REFRACTION      2. Myopia of both eyes  H52.13           PLAN:   +1.25 over the counter readers   Vianca Guzmán OD     Again, thank you for allowing me to participate in the care of your patient.        Sincerely,        Vianca Guzmán, OD

## 2024-02-06 NOTE — PROGRESS NOTES
Chief Complaint   Patient presents with    Annual Eye Exam        Last Eye Exam: 1-2 years ago   Dilated Previously: Yes, side effects of dilation explained today    What are you currently using to see?  Glasses - has a PAL uses only for reading        Distance Vision Acuity: Satisfied with vision unaided     Near Vision Acuity: Satisfied with vision while reading and using computer with glasses    Eye Comfort: good  Do you use eye drops? : No      Joann Stallworth - Optometric Assistant         Fohx; glaucoma both parents    Medical, surgical and family histories reviewed and updated 2/6/2024.       OBJECTIVE: See Ophthalmology exam    ASSESSMENT:    ICD-10-CM    1. Presbyopia  H52.4 EYE EXAM (SIMPLE-NONBILLABLE)     REFRACTION      2. Myopia of both eyes  H52.13           PLAN:   +1.25 over the counter readers   Vianca Guzmán OD

## 2024-03-11 ENCOUNTER — VIRTUAL VISIT (OUTPATIENT)
Dept: FAMILY MEDICINE | Facility: CLINIC | Age: 49
End: 2024-03-11
Attending: PHYSICIAN ASSISTANT
Payer: COMMERCIAL

## 2024-03-11 DIAGNOSIS — M87.076 AVASCULAR NECROSIS OF BONE OF FOOT (H): ICD-10-CM

## 2024-03-11 DIAGNOSIS — F41.1 GAD (GENERALIZED ANXIETY DISORDER): Primary | ICD-10-CM

## 2024-03-11 DIAGNOSIS — F10.11 HISTORY OF ETOH ABUSE: ICD-10-CM

## 2024-03-11 PROCEDURE — 99213 OFFICE O/P EST LOW 20 MIN: CPT | Mod: 95 | Performed by: PHYSICIAN ASSISTANT

## 2024-03-11 RX ORDER — ESCITALOPRAM OXALATE 10 MG/1
10 TABLET ORAL DAILY
Qty: 90 TABLET | Refills: 1 | Status: SHIPPED | OUTPATIENT
Start: 2024-03-11 | End: 2024-09-30

## 2024-03-11 NOTE — PROGRESS NOTES
"Rajeev is a 48 year old who is being evaluated via a billable video visit.      How would you like to obtain your AVS? Deepahart  If the video visit is dropped, the invitation should be resent by: Text to cell phone: [phone]  Will anyone else be joining your video visit? No          Assessment & Plan     Anxiety  History of ETOH abuse  Overall Rajeev is doing really well. Continues with sobriety and denies any cravings at all. Periodic meetings though less frequent than initially. Moods are good. Plan to continue current dose lexapro.    Avascular necrosis of bone of foot (H)  Continues seeing chiropractor/ortho but overall at this point just doing exercises to maintain mobility and strength to avoid surgery as long as possible        BMI  Estimated body mass index is 33.97 kg/m  as calculated from the following:    Height as of 1/8/24: 1.753 m (5' 9\").    Weight as of 1/8/24: 104.3 kg (230 lb).           Subjective   Rajeev is a 48 year old, presenting for the following health issues:  Follow Up        3/11/2024     8:31 AM   Additional Questions   Roomed by Mary ELISE MA         3/11/2024     8:31 AM   Patient Reported Additional Medications   Patient reports taking the following new medications None     History of Present Illness       Reason for visit:  Follow up?    He eats 2-3 servings of fruits and vegetables daily.He consumes 0 sweetened beverage(s) daily.He exercises with enough effort to increase his heart rate 30 to 60 minutes per day.  He exercises with enough effort to increase his heart rate 6 days per week.   He is taking medications regularly.         Kobe Bravo III is a 48 year old male who presents today for medication check  He mentions moods are doing well; no major changes  Taking lexapro with regularity; never misses days  He has gotten a bit busier with home/work life but still going to meeting a few times monthly  Remains sober and denies any cravings    Remains active; going to the gym 5+ days " weekly  Other than the ankle feeling well; doing ankle exercises    No other concerns today        Review of Systems  Constitutional, HEENT, cardiovascular, pulmonary, gi and gu systems are negative, except as otherwise noted.      Objective           Vitals:  No vitals were obtained today due to virtual visit.    Physical Exam   GENERAL: alert and no distress  EYES: Eyes grossly normal to inspection.  No discharge or erythema, or obvious scleral/conjunctival abnormalities.  RESP: No audible wheeze, cough, or visible cyanosis.    SKIN: Visible skin clear. No significant rash, abnormal pigmentation or lesions.  NEURO: Cranial nerves grossly intact.  Mentation and speech appropriate for age.  PSYCH: Appropriate affect, tone, and pace of words        Video-Visit Details    Type of service:  Video Visit     Originating Location (pt. Location): Home    Distant Location (provider location):  On-site  Platform used for Video Visit: Ghazala  Signed Electronically by: Sukumar Lucero PA-C

## 2024-07-02 ENCOUNTER — TELEPHONE (OUTPATIENT)
Dept: FAMILY MEDICINE | Facility: CLINIC | Age: 49
End: 2024-07-02
Payer: COMMERCIAL

## 2024-07-10 ENCOUNTER — VIRTUAL VISIT (OUTPATIENT)
Dept: SLEEP MEDICINE | Facility: CLINIC | Age: 49
End: 2024-07-10
Payer: COMMERCIAL

## 2024-07-10 VITALS — HEIGHT: 69 IN | WEIGHT: 240 LBS | BODY MASS INDEX: 35.55 KG/M2

## 2024-07-10 DIAGNOSIS — G47.33 OSA (OBSTRUCTIVE SLEEP APNEA): Primary | ICD-10-CM

## 2024-07-10 PROCEDURE — 99214 OFFICE O/P EST MOD 30 MIN: CPT | Mod: 95 | Performed by: PSYCHIATRY & NEUROLOGY

## 2024-07-10 ASSESSMENT — SLEEP AND FATIGUE QUESTIONNAIRES
HOW LIKELY ARE YOU TO NOD OFF OR FALL ASLEEP WHEN YOU ARE A PASSENGER IN A CAR FOR AN HOUR WITHOUT A BREAK: WOULD NEVER DOZE
HOW LIKELY ARE YOU TO NOD OFF OR FALL ASLEEP WHILE SITTING AND TALKING TO SOMEONE: WOULD NEVER DOZE
HOW LIKELY ARE YOU TO NOD OFF OR FALL ASLEEP WHILE LYING DOWN TO REST IN THE AFTERNOON WHEN CIRCUMSTANCES PERMIT: WOULD NEVER DOZE
HOW LIKELY ARE YOU TO NOD OFF OR FALL ASLEEP WHILE SITTING INACTIVE IN A PUBLIC PLACE: WOULD NEVER DOZE
HOW LIKELY ARE YOU TO NOD OFF OR FALL ASLEEP WHILE WATCHING TV: SLIGHT CHANCE OF DOZING
HOW LIKELY ARE YOU TO NOD OFF OR FALL ASLEEP WHILE SITTING QUIETLY AFTER LUNCH WITHOUT ALCOHOL: WOULD NEVER DOZE
HOW LIKELY ARE YOU TO NOD OFF OR FALL ASLEEP WHILE SITTING AND READING: WOULD NEVER DOZE
HOW LIKELY ARE YOU TO NOD OFF OR FALL ASLEEP IN A CAR, WHILE STOPPED FOR A FEW MINUTES IN TRAFFIC: WOULD NEVER DOZE

## 2024-07-10 ASSESSMENT — PAIN SCALES - GENERAL: PAINLEVEL: NO PAIN (0)

## 2024-07-10 NOTE — NURSING NOTE
Has patient had flu shot for current/most recent flu season? If so, when? No        Current patient location: [id number] [place] [id number]    Is the patient currently in the state of [place]? YES    Visit mode:VIDEO    If the visit is dropped, the patient can be reconnected by: VIDEO VISIT: Text to cell phone:   Telephone Information:   Mobile [phone]       Will anyone else be joining the visit? NO  (If patient encounters technical issues they should call [phone])    How would you like to obtain your AVS? MyChart    Are changes needed to the allergy or medication list? No    Are refills needed on medications prescribed by this physician? NO    Reason for visit: RECHECK    Iraida MEJIA

## 2024-07-10 NOTE — PROGRESS NOTES
Virtual Visit Details    Type of service:  Video Visit     Originating Location (pt. Location): Home    Distant Location (provider location):  On-site  Platform used for Video Visit: AmWell    Brief sleep staff note  Summary Sleep Diagnoses:     1)RBD 5HT confirmed on PSG on 6/23: Mr del castillo has one episode of hitting with evidence of RSWA on PSG. He has been on selective serotonin reuptake inhibitor inially on Prozac, switched to lexapro 4 month ago. He is not on melatonin. Previous exam did not appreciated resting or intention tremor.  No bradykinesia or cogwheel rigidity,  gait instability,  dysdiadochokinesia.  Proprioception intact and pull test is negative. At our current visit he has not had any persisting dream enactment and he notices that much has improved now that he has maintained alcohol sobriety.  This is great. He is not treating for any dream enactment and at this time he is not concerned about it.  I asked him to let me know if he has any dream enactment and to mychart me.     We again discussed relationship to PD and related diseases.  His risk of phenoconversion is low.  He can smell normal, no constipation or other ancillary symptoms.      Most importantly we talked about bedroom safety and particular our advice to remove firearms from bedroom because of the risk of accidental discharge during dream enactment.  He indicated he understood.     Will follow up with me via BuzzStarterhart if any KRISHNA were to emerge and PRN in clinic.      2) mild obstructive sleep apnea with hypoxemia : PSG  ON 6/23: AHI 6.5, RDI 7.7, Supine AHI 47.3, REM AHI 1.2, Low O2 86.0%, Time Spent ?88% 32.3 minutes / Time Spent ?89% 87.3 minutes:      We discussed treatment option for mild obstructive sleep apnea and he has a oral appliance but it is broken.  He is looking for a new provider and I gave him a couple of options in [facility] and [place].  He will follow up with dental sleep medicine.     Denies daytime sleepiness and denies  sleepiness while driving.      3) History of alcohol use disorder: Patient is sober since July 15, 2023.  Again congratulated him on his sobrity.      All questions were answered.    It is a great privilege being asked to participate in this patients care.  The patient has been advised on the importance in of never operating operating a motor vehicle while tired or sleepy.        I visited with the patient directly but also extensively reviewed chart and coordinated care. Total time spent in the care of this patient today (Face-to-Face time + chart time, , and coordination of care) was 30 minutes.

## 2024-08-12 ENCOUNTER — PATIENT OUTREACH (OUTPATIENT)
Dept: CARE COORDINATION | Facility: CLINIC | Age: 49
End: 2024-08-12
Payer: COMMERCIAL

## 2024-08-26 ENCOUNTER — PATIENT OUTREACH (OUTPATIENT)
Dept: CARE COORDINATION | Facility: CLINIC | Age: 49
End: 2024-08-26
Payer: COMMERCIAL

## 2024-09-29 SDOH — HEALTH STABILITY: PHYSICAL HEALTH: ON AVERAGE, HOW MANY MINUTES DO YOU ENGAGE IN EXERCISE AT THIS LEVEL?: 40 MIN

## 2024-09-29 SDOH — HEALTH STABILITY: PHYSICAL HEALTH: ON AVERAGE, HOW MANY DAYS PER WEEK DO YOU ENGAGE IN MODERATE TO STRENUOUS EXERCISE (LIKE A BRISK WALK)?: 5 DAYS

## 2024-09-29 ASSESSMENT — SOCIAL DETERMINANTS OF HEALTH (SDOH): HOW OFTEN DO YOU GET TOGETHER WITH FRIENDS OR RELATIVES?: TWICE A WEEK

## 2024-09-30 ENCOUNTER — OFFICE VISIT (OUTPATIENT)
Dept: FAMILY MEDICINE | Facility: CLINIC | Age: 49
End: 2024-09-30
Payer: COMMERCIAL

## 2024-09-30 VITALS
TEMPERATURE: 98.1 F | HEIGHT: 69 IN | WEIGHT: 243 LBS | OXYGEN SATURATION: 95 % | BODY MASS INDEX: 35.99 KG/M2 | DIASTOLIC BLOOD PRESSURE: 78 MMHG | SYSTOLIC BLOOD PRESSURE: 111 MMHG | HEART RATE: 98 BPM | RESPIRATION RATE: 16 BRPM

## 2024-09-30 DIAGNOSIS — F41.1 GAD (GENERALIZED ANXIETY DISORDER): ICD-10-CM

## 2024-09-30 DIAGNOSIS — Z87.898 HISTORY OF ALCOHOL USE: ICD-10-CM

## 2024-09-30 DIAGNOSIS — E66.01 MORBID OBESITY (H): ICD-10-CM

## 2024-09-30 DIAGNOSIS — Z00.00 ROUTINE GENERAL MEDICAL EXAMINATION AT A HEALTH CARE FACILITY: Primary | ICD-10-CM

## 2024-09-30 DIAGNOSIS — K76.0 HEPATIC STEATOSIS: ICD-10-CM

## 2024-09-30 PROBLEM — F10.90 ALCOHOL USE: Status: RESOLVED | Noted: 2023-11-20 | Resolved: 2024-09-30

## 2024-09-30 PROBLEM — Z78.9 ALCOHOL USE: Status: RESOLVED | Noted: 2023-11-20 | Resolved: 2024-09-30

## 2024-09-30 LAB
ALBUMIN SERPL BCG-MCNC: 4.6 G/DL (ref 3.5–5.2)
ALP SERPL-CCNC: 76 U/L (ref 40–150)
ALT SERPL W P-5'-P-CCNC: 27 U/L (ref 0–70)
ANION GAP SERPL CALCULATED.3IONS-SCNC: 11 MMOL/L (ref 7–15)
AST SERPL W P-5'-P-CCNC: 26 U/L (ref 0–45)
BILIRUB SERPL-MCNC: 0.5 MG/DL
BUN SERPL-MCNC: 14 MG/DL (ref 6–20)
CALCIUM SERPL-MCNC: 10.1 MG/DL (ref 8.8–10.4)
CHLORIDE SERPL-SCNC: 104 MMOL/L (ref 98–107)
CHOLEST SERPL-MCNC: 188 MG/DL
CREAT SERPL-MCNC: 1.14 MG/DL (ref 0.67–1.17)
EGFRCR SERPLBLD CKD-EPI 2021: 80 ML/MIN/1.73M2
ERYTHROCYTE [DISTWIDTH] IN BLOOD BY AUTOMATED COUNT: 12.2 % (ref 10–15)
FASTING STATUS PATIENT QL REPORTED: YES
FASTING STATUS PATIENT QL REPORTED: YES
GLUCOSE SERPL-MCNC: 102 MG/DL (ref 70–99)
HCO3 SERPL-SCNC: 24 MMOL/L (ref 22–29)
HCT VFR BLD AUTO: 44.3 % (ref 40–53)
HDLC SERPL-MCNC: 52 MG/DL
HGB BLD-MCNC: 15.9 G/DL (ref 13.3–17.7)
LDLC SERPL CALC-MCNC: 118 MG/DL
MCH RBC QN AUTO: 31.1 PG (ref 26.5–33)
MCHC RBC AUTO-ENTMCNC: 35.9 G/DL (ref 31.5–36.5)
MCV RBC AUTO: 87 FL (ref 78–100)
NONHDLC SERPL-MCNC: 136 MG/DL
PLATELET # BLD AUTO: 263 10E3/UL (ref 150–450)
POTASSIUM SERPL-SCNC: 4.5 MMOL/L (ref 3.4–5.3)
PROT SERPL-MCNC: 7.2 G/DL (ref 6.4–8.3)
RBC # BLD AUTO: 5.11 10E6/UL (ref 4.4–5.9)
SODIUM SERPL-SCNC: 139 MMOL/L (ref 135–145)
TRIGL SERPL-MCNC: 92 MG/DL
WBC # BLD AUTO: 4.6 10E3/UL (ref 4–11)

## 2024-09-30 PROCEDURE — 90471 IMMUNIZATION ADMIN: CPT | Performed by: PHYSICIAN ASSISTANT

## 2024-09-30 PROCEDURE — 80053 COMPREHEN METABOLIC PANEL: CPT | Performed by: PHYSICIAN ASSISTANT

## 2024-09-30 PROCEDURE — 99396 PREV VISIT EST AGE 40-64: CPT | Mod: 25 | Performed by: PHYSICIAN ASSISTANT

## 2024-09-30 PROCEDURE — 80061 LIPID PANEL: CPT | Performed by: PHYSICIAN ASSISTANT

## 2024-09-30 PROCEDURE — 99213 OFFICE O/P EST LOW 20 MIN: CPT | Mod: 25 | Performed by: PHYSICIAN ASSISTANT

## 2024-09-30 PROCEDURE — 85027 COMPLETE CBC AUTOMATED: CPT | Performed by: PHYSICIAN ASSISTANT

## 2024-09-30 PROCEDURE — 36415 COLL VENOUS BLD VENIPUNCTURE: CPT | Performed by: PHYSICIAN ASSISTANT

## 2024-09-30 PROCEDURE — 90746 HEPB VACCINE 3 DOSE ADULT IM: CPT | Performed by: PHYSICIAN ASSISTANT

## 2024-09-30 RX ORDER — ESCITALOPRAM OXALATE 10 MG/1
10 TABLET ORAL DAILY
Qty: 90 TABLET | Refills: 1 | Status: SHIPPED | OUTPATIENT
Start: 2024-09-30

## 2024-09-30 ASSESSMENT — PAIN SCALES - GENERAL: PAINLEVEL: NO PAIN (0)

## 2024-09-30 NOTE — PROGRESS NOTES
"Preventive Care Visit  [facility] XIOMARA Lucero PA-C, Family Medicine  Sep 30, 2024      Assessment & Plan     Routine general medical examination at a health care facility  Reviewed personal and family history. Reviewed age appropriate screenings. Recommended any needed vaccinations. He is ok with updating Hep B today  - Lipid panel reflex to direct LDL Fasting; Future  - Comprehensive metabolic panel (BMP + Alb, Alk Phos, ALT, AST, Total. Bili, TP); Future    History of alcohol use  Now sober 1+ years. Doing really well    FARHAD (generalized anxiety disorder)  Controlled. Continue present management.   - escitalopram (LEXAPRO) 10 MG tablet; Take 1 tablet (10 mg) by mouth daily.    Morbid obesity (H)  Discussed some lifestyle improvements. He is also lifting weights which will skew his bmi. Leaner would help with his liver    Hepatic steatosis  Updating FIB4 score. Continue sobriety and work to lose weight  - Comprehensive metabolic panel (BMP + Alb, Alk Phos, ALT, AST, Total. Bili, TP); Future  - CBC with platelets; Future      BMI  Estimated body mass index is 35.88 kg/m  as calculated from the following:    Height as of this encounter: 1.753 m (5' 9\").    Weight as of this encounter: 110.2 kg (243 lb).       Counseling  Appropriate preventive services were addressed with this patient via screening, questionnaire, or discussion as appropriate for fall prevention, nutrition, physical activity, Tobacco-use cessation, social engagement, weight loss and cognition.  Checklist reviewing preventive services available has been given to the patient.  Reviewed patient's diet, addressing concerns and/or questions.   He is at risk for psychosocial distress and has been provided with information to reduce risk.           Subjective   Rajeev is a 49 year old, presenting for the following:  Physical        9/30/2024     8:09 AM   Additional Questions   Roomed by rubén Bravo III is a " 49 year old male who presents today for annual check up  Overall mental health is going great  Sober now well over 1 year    With the foot he is close to being pain free  Able to be active -- swimming and lifting weights   -still not able to run; hesitant more than anything          9/29/2024   General Health   How would you rate your overall physical health? Good   Feel stress (tense, anxious, or unable to sleep) Only a little      (!) STRESS CONCERN      9/29/2024   Nutrition   Three or more servings of calcium each day? Yes   Diet: Regular (no restrictions)   How many servings of fruit and vegetables per day? (!) 2-3   How many sweetened beverages each day? 0-1            9/29/2024   Exercise   Days per week of moderate/strenous exercise 5 days   Average minutes spent exercising at this level 40 min            9/29/2024   Social Factors   Frequency of gathering with friends or relatives Twice a week   Worry food won't last until get money to buy more No   Food not last or not have enough money for food? No   Do you have housing? (Housing is defined as stable permanent housing and does not include staying ouside in a car, in a tent, in an abandoned building, in an overnight shelter, or couch-surfing.) Yes   Are you worried about losing your housing? No   Lack of transportation? No   Unable to get utilities (heat,electricity)? No            9/29/2024   Dental   Dentist two times every year? Yes            9/29/2024   TB Screening   Were you born outside of the [place]? No                  9/29/2024   Substance Use   Alcohol more than 3/day or more than 7/wk Not Applicable   Do you use any other substances recreationally? No        Social History     Tobacco Use    Smoking status: Never     Passive exposure: Never    Smokeless tobacco: Former     Types: Chew     Quit date: 6/11/2011   Vaping Use    Vaping status: Never Used   Substance Use Topics    Alcohol use: Yes    Drug use: No           9/29/2024   STI Screening  "  New sexual partner(s) since last STI/HIV test? No      ASCVD Risk   The 10-year ASCVD risk score (Melany VELASQUEZ, et al., 2019) is: 1.8%    Values used to calculate the score:      Age: 49 years      Sex: Male      Is Non- : No      Diabetic: No      Tobacco smoker: No      Systolic Blood Pressure: 111 mmHg      Is BP treated: No      HDL Cholesterol: 49 mg/dL      Total Cholesterol: 189 mg/dL        9/29/2024   Contraception/Family Planning   Questions about contraception or family planning No           Reviewed and updated as needed this visit by Provider         Elian Gunter            Lab work is in process  Labs reviewed in EPIC      Review of Systems  Constitutional, HEENT, cardiovascular, pulmonary, gi and gu systems are negative, except as otherwise noted.     Objective    Exam  /78   Pulse 98   Temp 98.1  F (36.7  C)   Resp 16   Ht 1.753 m (5' 9\")   Wt 110.2 kg (243 lb)   SpO2 95%   BMI 35.88 kg/m     Estimated body mass index is 35.88 kg/m  as calculated from the following:    Height as of this encounter: 1.753 m (5' 9\").    Weight as of this encounter: 110.2 kg (243 lb).    Physical Exam  GENERAL: alert and no distress  EYES: Eyes grossly normal to inspection, PERRL and conjunctivae and sclerae normal  HENT: ear canals and TM's normal, nose and mouth without ulcers or lesions  NECK: no adenopathy, no asymmetry, masses, or scars  RESP: lungs clear to auscultation - no rales, rhonchi or wheezes  CV: regular rate and rhythm, normal S1 S2, no S3 or S4, no murmur, click or rub, no peripheral edema  ABDOMEN: soft, nontender, no hepatosplenomegaly, no masses and bowel sounds normal  MS: No peripheral edema   SKIN: no suspicious lesions or rashes  PSYCH: mentation appears normal, affect normal/bright        Signed Electronically by: Sukumar Lucero PA-C    "

## 2024-09-30 NOTE — PATIENT INSTRUCTIONS
Patient Education   Preventive Care Advice   This is general advice given by our system to help you stay healthy. However, your care team may have specific advice just for you. Please talk to your care team about your preventive care needs.  Nutrition  Eat 5 or more servings of fruits and vegetables each day.  Try wheat bread, brown rice and whole grain pasta (instead of white bread, rice, and pasta).  Get enough calcium and vitamin D. Check the label on foods and aim for 100% of the RDA (recommended daily allowance).  Lifestyle  Exercise at least 150 minutes each week  (30 minutes a day, 5 days a week).  Do muscle strengthening activities 2 days a week. These help control your weight and prevent disease.  No smoking.  Wear sunscreen to prevent skin cancer.  Have a dental exam and cleaning every 6 months.  Yearly exams  See your health care team every year to talk about:  Any changes in your health.  Any medicines your care team has prescribed.  Preventive care, family planning, and ways to prevent chronic diseases.  Shots (vaccines)   HPV shots (up to age 26), if you've never had them before.  Hepatitis B shots (up to age 59), if you've never had them before.  COVID-19 shot: Get this shot when it's due.  Flu shot: Get a flu shot every year.  Tetanus shot: Get a tetanus shot every 10 years.  Pneumococcal, hepatitis A, and RSV shots: Ask your care team if you need these based on your risk.  Shingles shot (for age 50 and up)  General health tests  Diabetes screening:  Starting at age 35, Get screened for diabetes at least every 3 years.  If you are younger than age 35, ask your care team if you should be screened for diabetes.  Cholesterol test: At age 39, start having a cholesterol test every 5 years, or more often if advised.  Bone density scan (DEXA): At age 50, ask your care team if you should have this scan for osteoporosis (brittle bones).  Hepatitis C: Get tested at least once in your life.  STIs (sexually  transmitted infections)  Before age 24: Ask your care team if you should be screened for STIs.  After age 24: Get screened for STIs if you're at risk. You are at risk for STIs (including HIV) if:  You are sexually active with more than one person.  You don't use condoms every time.  You or a partner was diagnosed with a sexually transmitted infection.  If you are at risk for HIV, ask about PrEP medicine to prevent HIV.  Get tested for HIV at least once in your life, whether you are at risk for HIV or not.  Cancer screening tests  Cervical cancer screening: If you have a cervix, begin getting regular cervical cancer screening tests starting at age 21.  Breast cancer scan (mammogram): If you've ever had breasts, begin having regular mammograms starting at age 40. This is a scan to check for breast cancer.  Colon cancer screening: It is important to start screening for colon cancer at age 45.  Have a colonoscopy test every 10 years (or more often if you're at risk) Or, ask your provider about stool tests like a FIT test every year or Cologuard test every 3 years.  To learn more about your testing options, visit:   .  For help making a decision, visit:   https://bit.ly/ce32297.  Prostate cancer screening test: If you have a prostate, ask your care team if a prostate cancer screening test (PSA) at age 55 is right for you.  Lung cancer screening: If you are a current or former smoker ages 50 to 80, ask your care team if ongoing lung cancer screenings are right for you.  For informational purposes only. Not to replace the advice of your health care provider. Copyright   2023 [employer]. All rights reserved. Clinically reviewed by the [facility] Transitions Program. [employer] [id number] - REV 01/24.

## 2025-06-10 ENCOUNTER — TRANSFERRED RECORDS (OUTPATIENT)
Dept: HEALTH INFORMATION MANAGEMENT | Facility: CLINIC | Age: 50
End: 2025-06-10
Payer: COMMERCIAL

## (undated) DEVICE — KIT ENDO TURNOVER/PROCEDURE W/CLEAN A SCOPE LINERS 103888

## (undated) RX ORDER — SIMETHICONE 40MG/0.6ML
SUSPENSION, DROPS(FINAL DOSAGE FORM)(ML) ORAL
Status: DISPENSED
Start: 2022-11-03

## (undated) RX ORDER — FENTANYL CITRATE 0.05 MG/ML
INJECTION, SOLUTION INTRAMUSCULAR; INTRAVENOUS
Status: DISPENSED
Start: 2022-11-03